# Patient Record
Sex: MALE | Race: WHITE | Employment: FULL TIME | URBAN - METROPOLITAN AREA
[De-identification: names, ages, dates, MRNs, and addresses within clinical notes are randomized per-mention and may not be internally consistent; named-entity substitution may affect disease eponyms.]

---

## 2019-05-25 ENCOUNTER — HOSPITAL ENCOUNTER (EMERGENCY)
Age: 74
Discharge: HOME | End: 2019-05-25
Payer: MEDICARE

## 2019-05-25 VITALS
TEMPERATURE: 98.4 F | OXYGEN SATURATION: 96 % | DIASTOLIC BLOOD PRESSURE: 70 MMHG | BODY MASS INDEX: 33 KG/M2 | WEIGHT: 230 LBS | RESPIRATION RATE: 18 BRPM | SYSTOLIC BLOOD PRESSURE: 124 MMHG | HEART RATE: 90 BPM

## 2019-05-25 DIAGNOSIS — L50.9 URTICARIA: Primary | ICD-10-CM

## 2019-05-25 PROCEDURE — 6370000000 HC RX 637 (ALT 250 FOR IP)

## 2019-05-25 PROCEDURE — 99213 OFFICE O/P EST LOW 20 MIN: CPT

## 2019-05-25 PROCEDURE — 96372 THER/PROPH/DIAG INJ SC/IM: CPT

## 2019-05-25 PROCEDURE — 6360000002 HC RX W HCPCS

## 2019-05-25 RX ORDER — METHYLPREDNISOLONE SODIUM SUCCINATE 125 MG/2ML
INJECTION, POWDER, LYOPHILIZED, FOR SOLUTION INTRAMUSCULAR; INTRAVENOUS
Status: COMPLETED
Start: 2019-05-25 | End: 2019-05-25

## 2019-05-25 RX ORDER — METHYLPREDNISOLONE SODIUM SUCCINATE 125 MG/2ML
125 INJECTION, POWDER, LYOPHILIZED, FOR SOLUTION INTRAMUSCULAR; INTRAVENOUS ONCE
Status: COMPLETED | OUTPATIENT
Start: 2019-05-25 | End: 2019-05-25

## 2019-05-25 RX ORDER — DIPHENHYDRAMINE HCL 25 MG
50 TABLET ORAL ONCE
Status: COMPLETED | OUTPATIENT
Start: 2019-05-25 | End: 2019-05-25

## 2019-05-25 RX ORDER — DIPHENHYDRAMINE HCL 25 MG
TABLET ORAL
Status: COMPLETED
Start: 2019-05-25 | End: 2019-05-25

## 2019-05-25 RX ORDER — METHYLPREDNISOLONE 4 MG/1
TABLET ORAL
Qty: 21 TABLET | Status: SHIPPED | OUTPATIENT
Start: 2019-05-25 | End: 2019-05-31

## 2019-05-25 RX ADMIN — Medication 50 MG: at 10:06

## 2019-05-25 RX ADMIN — METHYLPREDNISOLONE SODIUM SUCCINATE 125 MG: 125 INJECTION, POWDER, FOR SOLUTION INTRAMUSCULAR; INTRAVENOUS at 10:06

## 2019-05-25 RX ADMIN — DIPHENHYDRAMINE HCL 50 MG: 25 TABLET, COATED ORAL at 10:06

## 2019-05-25 RX ADMIN — METHYLPREDNISOLONE SODIUM SUCCINATE 125 MG: 125 INJECTION, POWDER, LYOPHILIZED, FOR SOLUTION INTRAMUSCULAR; INTRAVENOUS at 10:06

## 2019-05-25 NOTE — ED PROVIDER NOTES
Department of Emergency Medicine  [address] [place] [address]  Provider Note  Admit Date/Time: 5/25/2019  9:58 AM  Room: 03/03  MRN: [id number]  Chief Complaint: Rash (Pt c/o raised red rash all over his body since this AM states he has been on Bactrim for 7 days, took his last dose yesterday)       History of Present Illness   Source of history provided by:  patient. History/Exam Limitations: none. Teresa Byers is a 72 y.o. male who has a past medical history of:   Past Medical History:   Diagnosis Date    Hypothyroid     presents to the urgent care center by private car for evaluation of hives. He said he had been on Bactrim  And took his last dose yesterday . Around 1:00 this morning developed hives all over his body. HE  is not having any trouble breathing or swallowing he is not short of breath his throat does not feel tight. ROS    Pertinent positives and negatives are stated within HPI, all other systems reviewed and are negative. No past surgical history on file. Social History:  reports that he has never smoked. He does not have any smokeless tobacco history on file. He reports that he does not drink alcohol or use drugs. Family History: family history is not on file. Allergies: Bactrim [sulfamethoxazole-trimethoprim] and Pcn [penicillins]    Physical Exam   Oxygen Saturation Interpretation: Normal.   ED Triage Vitals [05/25/19 1001]   BP Temp Temp Source Pulse Resp SpO2 Height Weight   136/77 98.4 °F (36.9 °C) Oral 98 18 96 % -- 230 lb (104.3 kg)       Physical Exam  · Constitutional/General: Alert and oriented x3, well appearing, non toxic in NAD  · HEENT:  NC/NT. PERRLA,  Airway patent. No facial,  Lip,  Tongue,  or uvular swelling  · Neck: Supple, full ROM, non tender to palpation in the midline, no stridor, no crepitus, no meningeal signs  · Respiratory: Lungs clear to auscultation bilaterally, no wheezes, rales, or rhonchi.  Not in respiratory distress  · CV:  Regular

## 2022-08-24 ENCOUNTER — HOSPITAL ENCOUNTER (EMERGENCY)
Age: 77
Discharge: HOME | End: 2022-08-24
Attending: EMERGENCY MEDICINE
Payer: MEDICARE

## 2022-08-24 VITALS
HEART RATE: 94 BPM | SYSTOLIC BLOOD PRESSURE: 178 MMHG | OXYGEN SATURATION: 99 % | DIASTOLIC BLOOD PRESSURE: 89 MMHG | RESPIRATION RATE: 20 BRPM | TEMPERATURE: 98.6 F

## 2022-08-24 DIAGNOSIS — L02.91 ABSCESS: Primary | ICD-10-CM

## 2022-08-24 PROCEDURE — 90471 IMMUNIZATION ADMIN: CPT | Performed by: PHYSICIAN ASSISTANT

## 2022-08-24 PROCEDURE — 90714 TD VACC NO PRESV 7 YRS+ IM: CPT | Performed by: PHYSICIAN ASSISTANT

## 2022-08-24 PROCEDURE — 87070 CULTURE OTHR SPECIMN AEROBIC: CPT

## 2022-08-24 PROCEDURE — 2500000003 HC RX 250 WO HCPCS: Performed by: PHYSICIAN ASSISTANT

## 2022-08-24 PROCEDURE — 6360000002 HC RX W HCPCS: Performed by: PHYSICIAN ASSISTANT

## 2022-08-24 PROCEDURE — 99284 EMERGENCY DEPT VISIT MOD MDM: CPT

## 2022-08-24 PROCEDURE — 87075 CULTR BACTERIA EXCEPT BLOOD: CPT

## 2022-08-24 RX ORDER — TETANUS AND DIPHTHERIA TOXOIDS ADSORBED 2; 2 [LF]/.5ML; [LF]/.5ML
0.5 INJECTION INTRAMUSCULAR ONCE
Status: COMPLETED | OUTPATIENT
Start: 2022-08-24 | End: 2022-08-24

## 2022-08-24 RX ORDER — DOXYCYCLINE HYCLATE 100 MG/1
100 CAPSULE ORAL ONCE
Status: DISCONTINUED | OUTPATIENT
Start: 2022-08-24 | End: 2022-08-24

## 2022-08-24 RX ORDER — LIDOCAINE HYDROCHLORIDE AND EPINEPHRINE 10; 10 MG/ML; UG/ML
20 INJECTION, SOLUTION INFILTRATION; PERINEURAL ONCE
Status: COMPLETED | OUTPATIENT
Start: 2022-08-24 | End: 2022-08-24

## 2022-08-24 RX ADMIN — LIDOCAINE HYDROCHLORIDE AND EPINEPHRINE 20 ML: 10; 10 INJECTION, SOLUTION INFILTRATION; PERINEURAL at 11:45

## 2022-08-24 RX ADMIN — TETANUS AND DIPHTHERIA TOXOIDS ADSORBED 0.5 ML: 2; 2 INJECTION INTRAMUSCULAR at 11:19

## 2022-08-24 NOTE — ED PROVIDER NOTES
ED Attending shared visit  CC: Lucrecia Whitman [id number]  Department of Emergency Medicine   ED  Encounter Note  Admit Date/RoomTime: 2022  8:53 AM  ED Room:     NAME: Cris Franks  : 1945  MRN: [id number]     Chief Complaint:  Abscess (Sent in by dermatology )    History of Present Illness        Cris Franks is a 78 y.o. old male who presents to the emergency department by private vehicle, for gradual onset tender area on left sided mid back, which occured 1 week(s) prior to arrival.  Since onset the symptoms have been persistent and worsening and moderate in severity. Symptoms are associated with increasing pain and swelling. He has a history of \"cyst\" that has been present for years and he believes that it got infected. He denies any fever, chills, night sweats, headache, chest pain, shortness of breath, nausea, vomiting, diarrhea, abdominal pain, or any midline back pain. ROS   Pertinent positives and negatives are stated within HPI, all other systems reviewed and are negative. Past Medical History:  has a past medical history of Hypothyroid. Surgical History:  has no past surgical history on file. Social History:  reports that he does not drink alcohol and does not use drugs. Family History: family history is not on file. Allergies: Bactrim [sulfamethoxazole-trimethoprim], Lisinopril, and Pcn [penicillins]    Physical Exam   Oxygen Saturation Interpretation: Normal.        ED Triage Vitals   BP Temp Temp src Pulse Resp SpO2 Height Weight   -- -- -- -- -- -- -- --       Physical Exam  Constitutional:  Alert, development consistent with age. HEENT:  NC/NT. Airway patent  Neck:  Normal ROM. Supple. Respiratory:  Clear to auscultation and breath sounds equal.  CV:  Regular rate and rhythm, normal heart sounds, without pathological murmurs, ectopy, gallops, or rubs. Back: No midline tenderness,. No crepitus or step-off. .  Extremities: No tenderness or edema noted.  Integument:  Normal turgor. Warm, dry. Tennis ball sized abscess to the patient's left lower thoracic back with surrounding erythema  Lymphatics: No lymphangitis or adenopathy noted. Neurological:  Oriented. Motor functions intact. Lab / Imaging Results   (All laboratory and radiology results have been personally reviewed by myself)  Labs:  No results found for this visit on 08/24/22. Imaging: All Radiology results interpreted by Radiologist unless otherwise noted. No orders to display     ED Course / Medical Decision Making     Medications   lidocaine-EPINEPHrine 1 %-1:[id number] injection 20 mL (has no administration in time range)   diptheria-tetanus toxoids [facility]) 2-2 LF/0.5ML injection 0.5 mL (has no administration in time range)        Re-examination:  8/24/22       Time: 1045  Patients symptoms have improved after treatment    Consult(s):   None    Procedure(s):  PROCEDURE  8/24/22       Time: 1015    INCISION AND DRAINAGE  Risks, benefits and alternatives (for applicable procedures below) described. Performed By: Linda Graves PA-C. Indication: Abscess located on back  Informed consent obtained: The patient was counseled regarding the procedure, it's indications, risks, potential complications and alternatives and any questions were answered. Consent was obtained. .  Prep: The skin was cleansed with povidone iodine, wiped with isopropyl alcohol and draped in a sterile fashion. Local Anesthesia:  obtained with Lidocaine 1% with epinephrine. Incision: The Abscess located on back was Incised by scalpal and copious, purulent fluid was drained. A wound culture was obtained. The wound  was irrigated and was packed with iodoform gauze. The wound was then covered with a sterile dressing. Patient tolerated the procedure well. MDM:      Patient presents to the emergency department for an abscess. Abscess was incised and drained in the emergency department.   Patient to continue taking doxycycline. Encouraged to call his dermatologist to schedule close follow-up appointment. Wound care discussed. Signs of worsening skin infection explained. Strict return precautions were discussed and he should come back immediately for new or worsening symptoms. .    Plan of Care/Counseling:  Charmaine Mcdaniel PA-C reviewed today's visit with the patient in addition to providing specific details for the plan of care and counseling regarding the diagnosis and prognosis. Questions are answered at this time and are agreeable with the plan. Assessment      1. Abscess      Plan   Discharged home  Patient condition is good    New Medications     New Prescriptions    No medications on file     Electronically signed by Charmaine Mcdaniel PA-C   DD: 8/24/22  **This report was transcribed using voice recognition software. Every effort was made to ensure accuracy; however, inadvertent computerized transcription errors may be present.   END OF ED PROVIDER NOTE        Charmaine Mcdaniel PA-C  08/24/22 1048

## 2022-08-26 ENCOUNTER — HOSPITAL ENCOUNTER (EMERGENCY)
Age: 77
Discharge: HOME | End: 2022-08-26
Payer: MEDICARE

## 2022-08-26 VITALS
TEMPERATURE: 98.7 F | DIASTOLIC BLOOD PRESSURE: 90 MMHG | BODY MASS INDEX: 31.5 KG/M2 | RESPIRATION RATE: 20 BRPM | OXYGEN SATURATION: 99 % | HEART RATE: 94 BPM | SYSTOLIC BLOOD PRESSURE: 160 MMHG | WEIGHT: 220 LBS | HEIGHT: 70 IN

## 2022-08-26 DIAGNOSIS — Z51.89 VISIT FOR WOUND CHECK: Primary | ICD-10-CM

## 2022-08-26 LAB — WOUND/ABSCESS: NORMAL

## 2022-08-26 PROCEDURE — 99211 OFF/OP EST MAY X REQ PHY/QHP: CPT

## 2022-08-26 RX ORDER — DOXYCYCLINE HYCLATE 100 MG
100 TABLET ORAL 2 TIMES DAILY
Qty: 8 TABLET | Refills: 0 | Status: SHIPPED | OUTPATIENT
Start: 2022-08-30 | End: 2022-09-03

## 2022-08-26 RX ORDER — DOXYCYCLINE HYCLATE 100 MG/1
100 CAPSULE ORAL 2 TIMES DAILY
COMMUNITY
End: 2022-10-30

## 2022-08-26 ASSESSMENT — PAIN - FUNCTIONAL ASSESSMENT: PAIN_FUNCTIONAL_ASSESSMENT: 0-10

## 2022-08-26 ASSESSMENT — PAIN SCALES - GENERAL: PAINLEVEL_OUTOF10: 0

## 2022-08-26 NOTE — DISCHARGE INSTRUCTIONS
Keep area clean  Change dressing daily and as needed  Continue doxycycline  Start taking extending doxy  If symptoms worsen go to the emergency department.

## 2022-08-26 NOTE — ED PROVIDER NOTES
[address] [facility]  Department of Emergency Medicine  UC Encounter Note  22   1:12 PM EDT      NAME: Raymond Mendoza  :  1945  MRN:  [id number]    Chief Complaint: Wound Check (Had I+D to cyst on back in ER here to have packing removed and someone to look at wound)      This is a 79-year-old male the presents to urgent care for wound check. He was seen in the emergency department 2 days ago he states and had a large cyst abscessed area to his back removed. There was packing placed he is here to have the packing removed. He denies any fevers or chills. He states that his wife says the area looks better. On first contact patient he appears to be in no acute distress. He is currently on doxycycline. Review of Systems  Pertinent positives and negatives are stated within HPI, all other systems reviewed and are negative. Physical Exam  Vitals and nursing note reviewed. Constitutional:       Appearance: He is well-developed. HENT:      Head: Normocephalic and atraumatic. Jaw: No trismus. Right Ear: Hearing, tympanic membrane, ear canal and external ear normal.      Left Ear: Hearing, tympanic membrane, ear canal and external ear normal.      Nose: Nose normal.      Right Sinus: No maxillary sinus tenderness or frontal sinus tenderness. Left Sinus: No maxillary sinus tenderness or frontal sinus tenderness. Mouth/Throat:      Pharynx: Uvula midline. No uvula swelling. Eyes:      General: Lids are normal.      Conjunctiva/sclera: Conjunctivae normal.      Pupils: Pupils are equal, round, and reactive to light. Cardiovascular:      Rate and Rhythm: Normal rate and regular rhythm. Heart sounds: Normal heart sounds. No murmur heard. Pulmonary:      Effort: Pulmonary effort is normal.      Breath sounds: Normal breath sounds. Abdominal:      General: Bowel sounds are normal.      Palpations: Abdomen is soft. Abdomen is not rigid.       Tenderness: and does not use drugs. Family History: family history is not on file. The patients home medications have been reviewed. Allergies: Bactrim [sulfamethoxazole-trimethoprim], Lisinopril, and Pcn [penicillins]    -------------------------------------------------- RESULTS -------------------------------------------------  No results found for this visit on 08/26/22. No orders to display       ------------------------- NURSING NOTES AND VITALS REVIEWED ---------------------------   The nursing notes within the ED encounter and vital signs as below have been reviewed. BP (!) 160/90   Pulse 94   Temp 98.7 °F (37.1 °C) (Infrared)   Resp 20   Ht 5' 10\" (1.778 m)   Wt 220 lb (99.8 kg)   SpO2 99%   BMI 31.57 kg/m²   Oxygen Saturation Interpretation: Normal      ------------------------------------------ PROGRESS NOTES ------------------------------------------   I have spoken with the patient and discussed todays results, in addition to providing specific details for the plan of care and counseling regarding the diagnosis and prognosis. Their questions are answered at this time and they are agreeable with the plan.      --------------------------------- ADDITIONAL PROVIDER NOTES ---------------------------------     This patient is stable for discharge. I have shared the specific conditions for return, as well as the importance of follow-up. * NOTE: This report was transcribed using voice recognition software. Every effort was made to ensure accuracy; however, inadvertent computerized transcription errors may be present.    --------------------------------- IMPRESSION AND DISPOSITION ---------------------------------    IMPRESSION  1.  Visit for wound check        DISPOSITION  Disposition: Discharge to home  Patient condition is good       Nicolette Grady PA-C  08/26/22 0638

## 2022-08-27 LAB
ANAEROBIC CULTURE: ABNORMAL
ANAEROBIC CULTURE: ABNORMAL
ORGANISM: ABNORMAL

## 2022-10-30 ENCOUNTER — HOSPITAL ENCOUNTER (EMERGENCY)
Age: 77
Discharge: ANOTHER HOSPITAL | DRG: 698 | End: 2022-10-30
Payer: MEDICARE

## 2022-10-30 ENCOUNTER — HOSPITAL ENCOUNTER (INPATIENT)
Age: 77
LOS: 1 days | Discharge: HOME | DRG: 698 | End: 2022-10-31
Attending: EMERGENCY MEDICINE | Admitting: INTERNAL MEDICINE
Payer: MEDICARE

## 2022-10-30 VITALS
RESPIRATION RATE: 20 BRPM | DIASTOLIC BLOOD PRESSURE: 106 MMHG | HEIGHT: 70 IN | BODY MASS INDEX: 33.64 KG/M2 | SYSTOLIC BLOOD PRESSURE: 143 MMHG | HEART RATE: 109 BPM | OXYGEN SATURATION: 96 % | TEMPERATURE: 100.2 F | WEIGHT: 235 LBS

## 2022-10-30 DIAGNOSIS — N39.0 SEPSIS DUE TO URINARY TRACT INFECTION (HCC): Primary | ICD-10-CM

## 2022-10-30 DIAGNOSIS — R50.9 FEVER, UNSPECIFIED FEVER CAUSE: ICD-10-CM

## 2022-10-30 DIAGNOSIS — A41.9 SEPSIS DUE TO URINARY TRACT INFECTION (HCC): Primary | ICD-10-CM

## 2022-10-30 DIAGNOSIS — N39.0 URINARY TRACT INFECTION WITHOUT HEMATURIA, SITE UNSPECIFIED: Primary | ICD-10-CM

## 2022-10-30 DIAGNOSIS — D72.829 LEUKOCYTOSIS, UNSPECIFIED TYPE: ICD-10-CM

## 2022-10-30 DIAGNOSIS — T83.511A URINARY TRACT INFECTION ASSOCIATED WITH INDWELLING URETHRAL CATHETER, INITIAL ENCOUNTER (HCC): ICD-10-CM

## 2022-10-30 DIAGNOSIS — N39.0 URINARY TRACT INFECTION ASSOCIATED WITH INDWELLING URETHRAL CATHETER, INITIAL ENCOUNTER (HCC): ICD-10-CM

## 2022-10-30 DIAGNOSIS — R00.0 TACHYCARDIA: ICD-10-CM

## 2022-10-30 LAB
ALBUMIN SERPL-MCNC: 4.3 G/DL (ref 3.5–5.2)
ALP BLD-CCNC: 112 U/L (ref 40–129)
ALT SERPL-CCNC: 29 U/L (ref 0–40)
ANION GAP SERPL CALCULATED.3IONS-SCNC: 9 MMOL/L (ref 7–16)
AST SERPL-CCNC: 25 U/L (ref 0–39)
BACTERIA: ABNORMAL /HPF
BACTERIA: ABNORMAL /HPF
BASOPHILS ABSOLUTE: 0.01 E9/L (ref 0–0.2)
BASOPHILS ABSOLUTE: 0.03 E9/L (ref 0–0.2)
BASOPHILS RELATIVE PERCENT: 0.1 % (ref 0–2)
BASOPHILS RELATIVE PERCENT: 0.2 % (ref 0–2)
BILIRUB SERPL-MCNC: 1.7 MG/DL (ref 0–1.2)
BILIRUBIN URINE: NEGATIVE
BILIRUBIN URINE: NEGATIVE
BLOOD, URINE: ABNORMAL
BLOOD, URINE: ABNORMAL
BUN BLDV-MCNC: 17 MG/DL (ref 6–23)
CALCIUM SERPL-MCNC: 9.3 MG/DL (ref 8.6–10.2)
CHLORIDE BLD-SCNC: 101 MMOL/L (ref 98–107)
CLARITY: ABNORMAL
CLARITY: ABNORMAL
CO2: 24 MMOL/L (ref 22–29)
CO2: 26 MMOL/L (ref 22–29)
COLOR: ABNORMAL
COLOR: ABNORMAL
CREAT SERPL-MCNC: 1.1 MG/DL (ref 0.7–1.2)
EOSINOPHILS ABSOLUTE: 0.01 E9/L (ref 0.05–0.5)
EOSINOPHILS ABSOLUTE: 0.02 E9/L (ref 0.05–0.5)
EOSINOPHILS RELATIVE PERCENT: 0.1 % (ref 0–6)
EOSINOPHILS RELATIVE PERCENT: 0.1 % (ref 0–6)
EPITHELIAL CELLS, UA: ABNORMAL /HPF
EPITHELIAL CELLS, UA: ABNORMAL /HPF
GFR SERPL CREATININE-BSD FRML MDRD: >60 ML/MIN/1.73
GFR SERPL CREATININE-BSD FRML MDRD: >60 ML/MIN/1.73
GLUCOSE BLD-MCNC: 115 MG/DL (ref 74–99)
GLUCOSE BLD-MCNC: 145 MG/DL (ref 74–99)
GLUCOSE URINE: NEGATIVE MG/DL
GLUCOSE URINE: NEGATIVE MG/DL
HCT VFR BLD CALC: 47 % (ref 37–54)
HCT VFR BLD CALC: 47.5 % (ref 37–54)
HEMOGLOBIN: 16.4 G/DL (ref 12.5–16.5)
HEMOGLOBIN: 16.5 G/DL (ref 12.5–16.5)
IMMATURE GRANULOCYTES #: 0.05 E9/L
IMMATURE GRANULOCYTES #: 0.05 E9/L
IMMATURE GRANULOCYTES %: 0.4 % (ref 0–5)
IMMATURE GRANULOCYTES %: 0.4 % (ref 0–5)
KETONES, URINE: 15 MG/DL
KETONES, URINE: 15 MG/DL
LACTIC ACID, SEPSIS: 1.4 MMOL/L (ref 0.5–1.9)
LACTIC ACID, SEPSIS: 1.4 MMOL/L (ref 0.5–1.9)
LEUKOCYTE ESTERASE, URINE: ABNORMAL
LEUKOCYTE ESTERASE, URINE: ABNORMAL
LYMPHOCYTES ABSOLUTE: 0.89 E9/L (ref 1.5–4)
LYMPHOCYTES ABSOLUTE: 0.93 E9/L (ref 1.5–4)
LYMPHOCYTES RELATIVE PERCENT: 6.7 % (ref 20–42)
LYMPHOCYTES RELATIVE PERCENT: 6.8 % (ref 20–42)
MCH RBC QN AUTO: 30.8 PG (ref 26–35)
MCH RBC QN AUTO: 30.9 PG (ref 26–35)
MCHC RBC AUTO-ENTMCNC: 34.5 % (ref 32–34.5)
MCHC RBC AUTO-ENTMCNC: 35.1 % (ref 32–34.5)
MCV RBC AUTO: 88 FL (ref 80–99.9)
MCV RBC AUTO: 89.3 FL (ref 80–99.9)
MONOCYTES ABSOLUTE: 0.84 E9/L (ref 0.1–0.95)
MONOCYTES ABSOLUTE: 0.85 E9/L (ref 0.1–0.95)
MONOCYTES RELATIVE PERCENT: 6.1 % (ref 2–12)
MONOCYTES RELATIVE PERCENT: 6.4 % (ref 2–12)
NEUTROPHILS ABSOLUTE: 11.4 E9/L (ref 1.8–7.3)
NEUTROPHILS ABSOLUTE: 11.85 E9/L (ref 1.8–7.3)
NEUTROPHILS RELATIVE PERCENT: 86.3 % (ref 43–80)
NEUTROPHILS RELATIVE PERCENT: 86.4 % (ref 43–80)
NITRITE, URINE: POSITIVE
NITRITE, URINE: POSITIVE
PDW BLD-RTO: 13.1 FL (ref 11.5–15)
PDW BLD-RTO: 13.2 FL (ref 11.5–15)
PERFORMED ON: ABNORMAL
PH UA: 5.5 (ref 5–9)
PH UA: 6 (ref 5–9)
PLATELET # BLD: 201 E9/L (ref 130–450)
PLATELET # BLD: 203 E9/L (ref 130–450)
PMV BLD AUTO: 10.1 FL (ref 7–12)
PMV BLD AUTO: 9.6 FL (ref 7–12)
POC ANION GAP: 11 MMOL/L (ref 7–16)
POC BUN: 14 MG/DL (ref 8–23)
POC CHLORIDE: 107 MMOL/L (ref 100–108)
POC CREATININE: 1.1 MG/DL (ref 0.7–1.2)
POC POTASSIUM: 4.1 MMOL/L (ref 3.5–5)
POC SODIUM: 142 MMOL/L (ref 132–146)
POTASSIUM REFLEX MAGNESIUM: 3.8 MMOL/L (ref 3.5–5)
PROTEIN UA: 100 MG/DL
PROTEIN UA: 100 MG/DL
RBC # BLD: 5.32 E12/L (ref 3.8–5.8)
RBC # BLD: 5.34 E12/L (ref 3.8–5.8)
RBC UA: >20 /HPF (ref 0–2)
RBC UA: ABNORMAL /HPF (ref 0–2)
SODIUM BLD-SCNC: 136 MMOL/L (ref 132–146)
SPECIFIC GRAVITY UA: >=1.03 (ref 1–1.03)
SPECIFIC GRAVITY UA: >=1.03 (ref 1–1.03)
TOTAL PROTEIN: 7.4 G/DL (ref 6.4–8.3)
UROBILINOGEN, URINE: 0.2 E.U./DL
UROBILINOGEN, URINE: 0.2 E.U./DL
WBC # BLD: 13.2 E9/L (ref 4.5–11.5)
WBC # BLD: 13.7 E9/L (ref 4.5–11.5)
WBC UA: >20 /HPF (ref 0–5)
WBC UA: ABNORMAL /HPF (ref 0–5)

## 2022-10-30 PROCEDURE — 99285 EMERGENCY DEPT VISIT HI MDM: CPT

## 2022-10-30 PROCEDURE — 87040 BLOOD CULTURE FOR BACTERIA: CPT

## 2022-10-30 PROCEDURE — 81001 URINALYSIS AUTO W/SCOPE: CPT

## 2022-10-30 PROCEDURE — 6370000000 HC RX 637 (ALT 250 FOR IP): Performed by: NURSE PRACTITIONER

## 2022-10-30 PROCEDURE — 1200000000 HC SEMI PRIVATE

## 2022-10-30 PROCEDURE — 36415 COLL VENOUS BLD VENIPUNCTURE: CPT

## 2022-10-30 PROCEDURE — 85025 COMPLETE CBC W/AUTO DIFF WBC: CPT

## 2022-10-30 PROCEDURE — 99211 OFF/OP EST MAY X REQ PHY/QHP: CPT

## 2022-10-30 PROCEDURE — 80053 COMPREHEN METABOLIC PANEL: CPT

## 2022-10-30 PROCEDURE — 2580000003 HC RX 258: Performed by: STUDENT IN AN ORGANIZED HEALTH CARE EDUCATION/TRAINING PROGRAM

## 2022-10-30 PROCEDURE — 82565 ASSAY OF CREATININE: CPT

## 2022-10-30 PROCEDURE — 82947 ASSAY GLUCOSE BLOOD QUANT: CPT

## 2022-10-30 PROCEDURE — 80051 ELECTROLYTE PANEL: CPT

## 2022-10-30 PROCEDURE — 84520 ASSAY OF UREA NITROGEN: CPT

## 2022-10-30 PROCEDURE — 83605 ASSAY OF LACTIC ACID: CPT

## 2022-10-30 PROCEDURE — 96361 HYDRATE IV INFUSION ADD-ON: CPT

## 2022-10-30 PROCEDURE — 6360000002 HC RX W HCPCS: Performed by: STUDENT IN AN ORGANIZED HEALTH CARE EDUCATION/TRAINING PROGRAM

## 2022-10-30 PROCEDURE — 87088 URINE BACTERIA CULTURE: CPT

## 2022-10-30 PROCEDURE — 96374 THER/PROPH/DIAG INJ IV PUSH: CPT

## 2022-10-30 RX ORDER — 0.9 % SODIUM CHLORIDE 0.9 %
1000 INTRAVENOUS SOLUTION INTRAVENOUS ONCE
Status: COMPLETED | OUTPATIENT
Start: 2022-10-30 | End: 2022-10-30

## 2022-10-30 RX ORDER — SODIUM CHLORIDE 0.9 % (FLUSH) 0.9 %
5-40 SYRINGE (ML) INJECTION EVERY 12 HOURS SCHEDULED
Status: DISCONTINUED | OUTPATIENT
Start: 2022-10-31 | End: 2022-10-31 | Stop reason: HOSPADM

## 2022-10-30 RX ORDER — ACETAMINOPHEN 650 MG/1
650 SUPPOSITORY RECTAL EVERY 6 HOURS PRN
Status: DISCONTINUED | OUTPATIENT
Start: 2022-10-30 | End: 2022-10-31 | Stop reason: HOSPADM

## 2022-10-30 RX ORDER — DEXTROSE MONOHYDRATE 100 MG/ML
INJECTION, SOLUTION INTRAVENOUS CONTINUOUS PRN
Status: DISCONTINUED | OUTPATIENT
Start: 2022-10-30 | End: 2022-10-31 | Stop reason: HOSPADM

## 2022-10-30 RX ORDER — SODIUM CHLORIDE 0.9 % (FLUSH) 0.9 %
5-40 SYRINGE (ML) INJECTION PRN
Status: DISCONTINUED | OUTPATIENT
Start: 2022-10-30 | End: 2022-10-31 | Stop reason: HOSPADM

## 2022-10-30 RX ORDER — SODIUM CHLORIDE 9 MG/ML
INJECTION, SOLUTION INTRAVENOUS PRN
Status: DISCONTINUED | OUTPATIENT
Start: 2022-10-30 | End: 2022-10-31 | Stop reason: HOSPADM

## 2022-10-30 RX ORDER — ACETAMINOPHEN 325 MG/1
650 TABLET ORAL EVERY 6 HOURS PRN
Status: DISCONTINUED | OUTPATIENT
Start: 2022-10-30 | End: 2022-10-31 | Stop reason: HOSPADM

## 2022-10-30 RX ORDER — POLYETHYLENE GLYCOL 3350 17 G/17G
17 POWDER, FOR SOLUTION ORAL DAILY PRN
Status: DISCONTINUED | OUTPATIENT
Start: 2022-10-30 | End: 2022-10-31 | Stop reason: HOSPADM

## 2022-10-30 RX ORDER — ACETAMINOPHEN 325 MG/1
650 TABLET ORAL ONCE
Status: COMPLETED | OUTPATIENT
Start: 2022-10-30 | End: 2022-10-30

## 2022-10-30 RX ORDER — ENOXAPARIN SODIUM 100 MG/ML
30 INJECTION SUBCUTANEOUS 2 TIMES DAILY
Status: DISCONTINUED | OUTPATIENT
Start: 2022-10-31 | End: 2022-10-31 | Stop reason: HOSPADM

## 2022-10-30 RX ADMIN — SODIUM CHLORIDE 1000 ML: 9 INJECTION, SOLUTION INTRAVENOUS at 17:30

## 2022-10-30 RX ADMIN — ACETAMINOPHEN 650 MG: 325 TABLET ORAL at 13:25

## 2022-10-30 RX ADMIN — WATER 2000 MG: 1 INJECTION INTRAMUSCULAR; INTRAVENOUS; SUBCUTANEOUS at 16:46

## 2022-10-30 ASSESSMENT — PAIN DESCRIPTION - LOCATION
LOCATION: PENIS

## 2022-10-30 ASSESSMENT — PAIN SCALES - GENERAL
PAINLEVEL_OUTOF10: 5
PAINLEVEL_OUTOF10: 4
PAINLEVEL_OUTOF10: 4
PAINLEVEL_OUTOF10: 5
PAINLEVEL_OUTOF10: 4

## 2022-10-30 ASSESSMENT — LIFESTYLE VARIABLES: HOW OFTEN DO YOU HAVE A DRINK CONTAINING ALCOHOL: MONTHLY OR LESS

## 2022-10-30 ASSESSMENT — PAIN DESCRIPTION - ONSET
ONSET: GRADUAL
ONSET: GRADUAL

## 2022-10-30 ASSESSMENT — PAIN DESCRIPTION - FREQUENCY
FREQUENCY: INTERMITTENT
FREQUENCY: INTERMITTENT

## 2022-10-30 ASSESSMENT — PAIN - FUNCTIONAL ASSESSMENT
PAIN_FUNCTIONAL_ASSESSMENT: 0-10
PAIN_FUNCTIONAL_ASSESSMENT: NONE - DENIES PAIN
PAIN_FUNCTIONAL_ASSESSMENT: 0-10

## 2022-10-30 ASSESSMENT — PAIN DESCRIPTION - PAIN TYPE
TYPE: ACUTE PAIN
TYPE: ACUTE PAIN

## 2022-10-30 ASSESSMENT — PAIN DESCRIPTION - DESCRIPTORS
DESCRIPTORS: BURNING

## 2022-10-30 NOTE — ED PROVIDER NOTES
Department of Emergency Medicine   [facility]  Provider Note  Admit Date/RoomTime: 10/30/2022 12:40 PM  Room:       NAME: Radha Peraza  : 1945  MRN: [id number]     Chief Complaint:  Other (States he has had a conrad catheter since . It is changed monthly and was changed 2 days ago. Now having burning when urine flows. Thinks he has a \"UTI\".) and Fever (Low grade fever this morning.)    History of Present Illness       Radha Peraza is a 78 y.o. old male who has a past medical history of:   Past Medical History:   Diagnosis Date    COVID     Enlarged prostate     Hypothyroid     presents to the urgent care center by private vehicle, for evaluation. He has a fever, he said the fever just started this morning fHe says that he also has  some burning \"when the urine flows into the tube \". He thinks he could have a UTI his Conrad catheter was just changed 2 days ago. He has had that in since  and he said he is going to have a surgery to alleviate his problem in future. He has had a catheter in since  of this year. Denies any respiratory symptoms he denies any abdominal pain or back pain does not have any cough or shortness of breath. ROS    Pertinent positives and negatives are stated within HPI, all other systems reviewed and are negative. Past Surgical History:   Procedure Laterality Date    SKIN BIOPSY     Social History:  reports that he has never smoked. He has never used smokeless tobacco. He reports that he does not drink alcohol and does not use drugs. Family History: family history is not on file.    Allergies: Bactrim [sulfamethoxazole-trimethoprim], Lisinopril, and Pcn [penicillins]    Physical Exam      Oxygen Saturation Interpretation: Normal.        ED Triage Vitals [10/30/22 1245]   BP Temp Temp Source Heart Rate Resp SpO2 Height Weight   (!) 161/96 100.1 °F (37.8 °C) Infrared (!) 111 20 97 % 5' 10\" (1.778 m) 235 lb (106.6 kg) Constitutional:  Alert, no distress  HEENT:  NC/NT. Neck:  Normal ROM. Supple. Respiratory:  Lungs Clear to auscultation and breath sounds equal.  CV:  tachycardia  GI: soft not tender. : conrad catheter in place. Back: CVA Tenderness: No CVA tenderness. Integument:    Warm, dry, without visible rash,   Lymphatics: No lymphangitis or adenopathy noted. Neurological:  Oriented. Motor functions intact.     Lab / Imaging Results   (All laboratory and radiology results have been personally reviewed by myself)  Labs:  Results for orders placed or performed during the hospital encounter of 10/30/22   Urinalysis   Result Value Ref Range    Color, UA DARK YELLOW (A) Straw/Yellow    Clarity, UA CLOUDY (A) Clear    Glucose, Ur Negative Negative mg/dL    Bilirubin Urine Negative Negative    Ketones, Urine 15 (A) Negative mg/dL    Specific Gravity, UA >=1.030 1.005 - 1.030    Blood, Urine MODERATE (A) Negative    pH, UA 6.0 5.0 - 9.0    Protein,  (A) Negative mg/dL    Urobilinogen, Urine 0.2 <2.0 E.U./dL    Nitrite, Urine POSITIVE (A) Negative    Leukocyte Esterase, Urine SMALL (A) Negative   CBC with Auto Differential   Result Value Ref Range    WBC 13.7 (H) 4.5 - 11.5 E9/L    RBC 5.34 3.80 - 5.80 E12/L    Hemoglobin 16.5 12.5 - 16.5 g/dL    Hematocrit 47.0 37.0 - 54.0 %    MCV 88.0 80.0 - 99.9 fL    MCH 30.9 26.0 - 35.0 pg    MCHC 35.1 (H) 32.0 - 34.5 %    RDW 13.2 11.5 - 15.0 fL    Platelets 009 734 - 569 E9/L    MPV 9.6 7.0 - 12.0 fL    Neutrophils % 86.4 (H) 43.0 - 80.0 %    Immature Granulocytes % 0.4 0.0 - 5.0 %    Lymphocytes % 6.8 (L) 20.0 - 42.0 %    Monocytes % 6.1 2.0 - 12.0 %    Eosinophils % 0.1 0.0 - 6.0 %    Basophils % 0.2 0.0 - 2.0 %    Neutrophils Absolute 11.85 (H) 1.80 - 7.30 E9/L    Immature Granulocytes # 0.05 E9/L    Lymphocytes Absolute 0.93 (L) 1.50 - 4.00 E9/L    Monocytes Absolute 0.84 0.10 - 0.95 E9/L    Eosinophils Absolute 0.02 (L) 0.05 - 0.50 E9/L    Basophils Absolute 0.03 0.00 - 0.20 E9/L   Microscopic Urinalysis   Result Value Ref Range    WBC, UA >20 (A) 0 - 5 /HPF    RBC, UA >20 0 - 2 /HPF    Epithelial Cells, UA FEW /HPF    Bacteria, UA MANY (A) None Seen /HPF   POCT Venous   Result Value Ref Range    POC Sodium 142 132 - 146 mmol/L    POC Potassium 4.1 3.5 - 5.0 mmol/L    POC Chloride 107 100 - 108 mmol/L    CO2 24 22 - 29 mmol/L    POC Anion Gap 11 7 - 16 mmol/L    POC Glucose 115 (H) 74 - 99 mg/dl    POC BUN 14 8 - 23 mg/dL    POC Creatinine 1.1 0.7 - 1.2 mg/dL    Est, Glom Filt Rate >60 >=60 mL/min/1.73    Performed on SEE BELOW        Imaging: All Radiology results interpreted by Radiologist unless otherwise noted. No orders to display     ED Course / Medical Decision Making     Medications   acetaminophen (TYLENOL) tablet 650 mg (650 mg Oral Given 10/30/22 1325)          Consults:   None    Medical Decision Making:    Patient's temperature when he came in was 100.1 his heart rate was 111 he was given Tylenol for the fever. Did check a urine on him he is concerned that he has a UTI, since he is febrile I checked a CBC and a chemistry also. Following the Tylenol his heart rate and temperature were rechecked it is now 100.2 and his heart rate is still up at 109. He does have a UTI showing up on his urinalysis and also his white count is elevated at 13.9. There is concern for sepsis since he is tachycardic he is febrile and his white count is elevated. I did speak with the ED attending Dr. Fan Camarena and he does feel that the patient should come over for further evaluation. Discussed this with the patient I did tell him the concern was that he could be getting septic and then he needs to go to the ED for further evaluation. He does agree to  go to the ED        Assessment      1. Urinary tract infection without hematuria, site unspecified    2. Fever, unspecified fever cause    3. Tachycardia    4.  Leukocytosis, unspecified type      Plan   Advised to go to the Emergency Department  New Medications     New Prescriptions    No medications on file     Electronically signed by VINCE Curtis CNP   DD: 10/30/22  **This report was transcribed using voice recognition software. Every effort was made to ensure accuracy; however, inadvertent computerized transcription errors may be present.   END OF ED PROVIDER NOTE            VINCE Curtis CNP  10/30/22 [id number]

## 2022-10-30 NOTE — ED PROVIDER NOTES
Department of Emergency Medicine   ED Provider Note  Admit Date/RoomTime: 10/30/2022  3:41 PM  ED Room: 22/22          History of Present Illness:  10/30/22, Time: 3:48 PM EDT         Anjana Monroy is a 78 y.o. male with history of BPH and indwelling Oconnor catheter presenting to the ED for dysuria, fevers, beginning this morning. The complaint has been persistent, moderate in severity, and worsened by urination, fever improved by Tylenol. Patient previously followed with Dr. Mari Baird for urology here, follows with Dr. Rebbeca Claude for urology at [facility] in [facility] [employer]. Patient states that he had issues with urinary retention secondary to BPH, since July has had indwelling Oconnor catheter. He has his catheter changed every month, just had the catheter changed around 3 days ago at local urology office. Since this morning he started having burning/pain with urine flow in his catheter, had subjective fevers with sweats. He was seen at urgent care and had a temperature of 100.2 Fahrenheit, was given Tylenol there, had lab work that showed a leukocytosis and was tachycardic and therefore was sent to the emergency department for evaluation. Patient currently not taking any antibiotics. No history of previous UTIs. No abdominal pain or flank pain or nausea or vomiting. No chest pain or shortness of breath or cough. No lightheadedness or syncope. No headache or vision changes or numbness weakness or tingling  He does have mild hematuria when he has his Oconnor catheter changed, this is not unusual for him, otherwise denies hematuria or urine discoloration. Review of Systems:     Pertinent positives and negatives are stated within HPI. 10 point ROS otherwise negative.  --------------------------------------------- PAST HISTORY ---------------------------------------------  Past Medical History:  has a past medical history of COVID, Enlarged prostate, and Hypothyroid.     Past Surgical History:  has a past surgical history that includes skin biopsy. Social History:  reports that he has never smoked. He has never used smokeless tobacco. He reports that he does not drink alcohol and does not use drugs. Family History: family history is not on file. The patients home medications have been reviewed. Allergies: Bactrim [sulfamethoxazole-trimethoprim], Lisinopril, and Pcn [penicillins]        ---------------------------------------------------PHYSICAL EXAM--------------------------------------    Constitutional/General: AAO to person/place/time/purpose, NAD, no labored breathing  Head: Normocephalic and atraumatic  Eyes: EOMI, conjunctiva normal, sclera non icteric  Mouth: Moist mucous membranes, uvula midline  Neck: Supple, no stridor, no meningeal signs  Respiratory: Lungs clear to auscultation bilaterally, no wheezes, rales, or rhonchi. Not in respiratory distress  Cardiovascular:  Regular rate. Regular rhythm. No murmurs, no gallops, or rubs. 2+ distal pulses. Equal extremity pulses. Chest: No chest wall tenderness or deformity  GI:  Abdomen Soft, Non tender, Non distended. No rebound, guarding, or rigidity. No pulsatile masses. No CVA tenderness bilaterally. : Indwelling Oconnor catheter with leg bag. Musculoskeletal: Moves all extremities x 4. Warm and well perfused, no clubbing, cyanosis, or edema. Capillary refill <3 seconds  Integument: skin warm and dry. No rashes. Neurologic: GCS 15, no focal deficits, symmetric strength 5/5 in the major muscle groups of upper and lower extremities bilaterally  Psychiatric: Normal Affect    -------------------------------------------------- RESULTS -------------------------------------------------  I have personally reviewed all laboratory and imaging results for this patient. Results are listed below.      LABS:  Results for orders placed or performed during the hospital encounter of 10/30/22   Comprehensive Metabolic Panel w/ Reflex to MG   Result Value Ref Range    Sodium 136 132 - 146 mmol/L    Potassium reflex Magnesium 3.8 3.5 - 5.0 mmol/L    Chloride 101 98 - 107 mmol/L    CO2 26 22 - 29 mmol/L    Anion Gap 9 7 - 16 mmol/L    Glucose 145 (H) 74 - 99 mg/dL    BUN 17 6 - 23 mg/dL    Creatinine 1.1 0.7 - 1.2 mg/dL    Est, Glom Filt Rate >60 >=60 mL/min/1.73    Calcium 9.3 8.6 - 10.2 mg/dL    Total Protein 7.4 6.4 - 8.3 g/dL    Albumin 4.3 3.5 - 5.2 g/dL    Total Bilirubin 1.7 (H) 0.0 - 1.2 mg/dL    Alkaline Phosphatase 112 40 - 129 U/L    ALT 29 0 - 40 U/L    AST 25 0 - 39 U/L   Urinalysis with Microscopic   Result Value Ref Range    Color, UA DKYELLOW Straw/Yellow    Clarity, UA CLOUDY (A) Clear    Glucose, Ur Negative Negative mg/dL    Bilirubin Urine Negative Negative    Ketones, Urine 15 (A) Negative mg/dL    Specific Gravity, UA >=1.030 1.005 - 1.030    Blood, Urine LARGE (A) Negative    pH, UA 5.5 5.0 - 9.0    Protein,  (A) Negative mg/dL    Urobilinogen, Urine 0.2 <2.0 E.U./dL    Nitrite, Urine POSITIVE (A) Negative    Leukocyte Esterase, Urine MODERATE (A) Negative    WBC, UA 10-20 (A) 0 - 5 /HPF    RBC, UA 2-5 0 - 2 /HPF    Epithelial Cells, UA FEW /HPF    Bacteria, UA MANY (A) None Seen /HPF   Lactate, Sepsis   Result Value Ref Range    Lactic Acid, Sepsis 1.4 0.5 - 1.9 mmol/L   Lactate, Sepsis   Result Value Ref Range    Lactic Acid, Sepsis 1.4 0.5 - 1.9 mmol/L   CBC with Auto Differential   Result Value Ref Range    WBC 13.2 (H) 4.5 - 11.5 E9/L    RBC 5.32 3.80 - 5.80 E12/L    Hemoglobin 16.4 12.5 - 16.5 g/dL    Hematocrit 47.5 37.0 - 54.0 %    MCV 89.3 80.0 - 99.9 fL    MCH 30.8 26.0 - 35.0 pg    MCHC 34.5 32.0 - 34.5 %    RDW 13.1 11.5 - 15.0 fL    Platelets 020 237 - 976 E9/L    MPV 10.1 7.0 - 12.0 fL    Neutrophils % 86.3 (H) 43.0 - 80.0 %    Immature Granulocytes % 0.4 0.0 - 5.0 %    Lymphocytes % 6.7 (L) 20.0 - 42.0 %    Monocytes % 6.4 2.0 - 12.0 %    Eosinophils % 0.1 0.0 - 6.0 %    Basophils % 0.1 0.0 - 2.0 %    Neutrophils Absolute 11.40 (H) 1.80 - 7.30 E9/L    Immature Granulocytes # 0.05 E9/L    Lymphocytes Absolute 0.89 (L) 1.50 - 4.00 E9/L    Monocytes Absolute 0.85 0.10 - 0.95 E9/L    Eosinophils Absolute 0.01 (L) 0.05 - 0.50 E9/L    Basophils Absolute 0.01 0.00 - 0.20 E9/L       RADIOLOGY:  Interpreted by Radiologist unless otherwise specified  No orders to display         ------------------------- NURSING NOTES AND VITALS REVIEWED ---------------------------   The nursing notes within the ED encounter and vital signs as below have been reviewed by myself. BP (!) 158/96   Pulse (!) 104   Temp 98.5 °F (36.9 °C)   Resp 22   Wt 235 lb (106.6 kg)   SpO2 98%   BMI 33.72 kg/m²   Oxygen Saturation Interpretation: Normal    The patients available past medical records and past encounters were reviewed. ------------------------------ ED COURSE/MEDICAL DECISION MAKING----------------------  Medications   cefTRIAXone (ROCEPHIN) 2,000 mg in sterile water 20 mL IV syringe (2,000 mg IntraVENous Given 10/30/22 1646)   0.9 % sodium chloride bolus (0 mLs IntraVENous Stopped 10/30/22 1802)            Medical Decision Making: This is a 72-year-old male presenting to the emerged department for fever, dysuria. Patient with indwelling Oconnor catheter, no history of previous UTI, no previous urine cultures. Patient with subjective fevers at home, borderline febrile at 100.2 Fahrenheit at urgent care, given Tylenol there. Patient was leukocytosis of greater than 13,000 here, tachycardic at 104, mildly hypertensive afebrile here. Patient with no abdominal pain or flank pain no concern for ureterolithiasis. Urinalysis with evidence of likely urinary tract infection. No lactic acidosis, lab work otherwise unremarkable and reassuring. Given likely early sepsis with leukocytosis, tachycardia and reported fever as well as patient's age and comorbidities, patient will be admitted for IV antibiotics and further work-up, treatment, monitoring.   Patient given IV Rocephin here, blood cultures obtained prior to antibiotic administration. Patient given IV fluids with improvement in heart rate. Patient comfortable with plan for admission and further treatment. See ED COURSE for additional MDM. Labs & imaging reviewed and interpreted, see RESULTS above. Re-Evaluations:             ED Course as of 10/30/22 2022   Long Mcgregor Oct 30, 2022   2007 Discussed case with Dr. Tamar Lau, agreed to admit patient to tele. [RH]      ED Course User Index  [RH] [address], DO         This patient's ED course included: a personal history and physicial examination, re-evaluation prior to disposition, multiple bedside re-evaluations, IV medications, and cardiac monitoring    This patient has remained hemodynamically stable and improved during their ED course. Consultations:  See ED Course    Counseling: The emergency provider has spoken with the patient and spouse/SO and discussed todays results, in addition to providing specific details for the plan of care and counseling regarding the diagnosis and prognosis. Questions are answered at this time and they are agreeable with the plan.       --------------------------------- IMPRESSION AND DISPOSITION ---------------------------------    IMPRESSION  1. Sepsis due to urinary tract infection ([facility]    2. Urinary tract infection associated with indwelling urethral catheter, [facility]        DISPOSITION  Disposition: Admit to telemetry  Patient condition is stable    NOTE: This report was transcribed using voice recognition software. Every effort was made to ensure accuracy; however, inadvertent computerized transcription errors may be present. Also please note that patient was seen and examined by attending physician. Plan of care and disposition discussed with attending physician and they were immediately available or present for all procedures performed.        -- Darleen Bence, D.O. PGY-3     Resident Physician     Emergency Medicine      10/30/2022 8:22 PM         [address] DO  Resident  10/30/22 2022      ATTENDING PROVIDER ATTESTATION:     I have personally performed and/or participated in the history, exam, medical decision making, and procedures and agree with all pertinent clinical information. I have also reviewed and agree with the past medical, family and social history unless otherwise noted. I have discussed this patient in detail with the resident, and provided the instruction and education regarding fever, dysuria, UTI and sepsis. My findings/Plan: Tachycardic. Lungs CTA bilaterally. Abdomen soft, nontender. Bowel sounds normal. No CVA tenderness. Supportive care. IV antibiotics. Admit for further evaluation and management.        [address] [place] [address],   12/08/22 8671

## 2022-10-30 NOTE — DISCHARGE INSTRUCTIONS
Advised to go to the Emergency Department for further evaluation-- UTI, fever, tachycardia, leukocytosis--

## 2022-10-31 VITALS
SYSTOLIC BLOOD PRESSURE: 171 MMHG | WEIGHT: 235 LBS | OXYGEN SATURATION: 96 % | HEART RATE: 81 BPM | TEMPERATURE: 98 F | DIASTOLIC BLOOD PRESSURE: 77 MMHG | RESPIRATION RATE: 18 BRPM | HEIGHT: 70 IN | BODY MASS INDEX: 33.64 KG/M2

## 2022-10-31 LAB
ALBUMIN SERPL-MCNC: 3.6 G/DL (ref 3.5–5.2)
ALP BLD-CCNC: 122 U/L (ref 40–129)
ALT SERPL-CCNC: 55 U/L (ref 0–40)
ANION GAP SERPL CALCULATED.3IONS-SCNC: 8 MMOL/L (ref 7–16)
AST SERPL-CCNC: 37 U/L (ref 0–39)
BASOPHILS ABSOLUTE: 0.02 E9/L (ref 0–0.2)
BASOPHILS RELATIVE PERCENT: 0.2 % (ref 0–2)
BILIRUB SERPL-MCNC: 1.7 MG/DL (ref 0–1.2)
BUN BLDV-MCNC: 15 MG/DL (ref 6–23)
CALCIUM SERPL-MCNC: 8.9 MG/DL (ref 8.6–10.2)
CHLORIDE BLD-SCNC: 102 MMOL/L (ref 98–107)
CO2: 25 MMOL/L (ref 22–29)
CREAT SERPL-MCNC: 1.1 MG/DL (ref 0.7–1.2)
EOSINOPHILS ABSOLUTE: 0.01 E9/L (ref 0.05–0.5)
EOSINOPHILS RELATIVE PERCENT: 0.1 % (ref 0–6)
GFR SERPL CREATININE-BSD FRML MDRD: >60 ML/MIN/1.73
GLUCOSE BLD-MCNC: 119 MG/DL (ref 74–99)
HCT VFR BLD CALC: 43.6 % (ref 37–54)
HEMOGLOBIN: 14.8 G/DL (ref 12.5–16.5)
IMMATURE GRANULOCYTES #: 0.1 E9/L
IMMATURE GRANULOCYTES %: 0.9 % (ref 0–5)
LV EF: 62 %
LVEF MODALITY: NORMAL
LYMPHOCYTES ABSOLUTE: 0.85 E9/L (ref 1.5–4)
LYMPHOCYTES RELATIVE PERCENT: 7.3 % (ref 20–42)
MAGNESIUM: 1.8 MG/DL (ref 1.6–2.6)
MCH RBC QN AUTO: 30.7 PG (ref 26–35)
MCHC RBC AUTO-ENTMCNC: 33.9 % (ref 32–34.5)
MCV RBC AUTO: 90.5 FL (ref 80–99.9)
MONOCYTES ABSOLUTE: 0.83 E9/L (ref 0.1–0.95)
MONOCYTES RELATIVE PERCENT: 7.1 % (ref 2–12)
NEUTROPHILS ABSOLUTE: 9.86 E9/L (ref 1.8–7.3)
NEUTROPHILS RELATIVE PERCENT: 84.4 % (ref 43–80)
PDW BLD-RTO: 13.1 FL (ref 11.5–15)
PHOSPHORUS: 1.9 MG/DL (ref 2.5–4.5)
PLATELET # BLD: 172 E9/L (ref 130–450)
PMV BLD AUTO: 10.4 FL (ref 7–12)
POTASSIUM SERPL-SCNC: 3.9 MMOL/L (ref 3.5–5)
PROCALCITONIN: 0.41 NG/ML (ref 0–0.08)
RBC # BLD: 4.82 E12/L (ref 3.8–5.8)
SODIUM BLD-SCNC: 135 MMOL/L (ref 132–146)
T4 FREE: 1.14 NG/DL (ref 0.93–1.7)
TOTAL PROTEIN: 6.7 G/DL (ref 6.4–8.3)
TSH SERPL DL<=0.05 MIU/L-ACNC: 2.04 UIU/ML (ref 0.27–4.2)
WBC # BLD: 11.7 E9/L (ref 4.5–11.5)

## 2022-10-31 PROCEDURE — 84145 PROCALCITONIN (PCT): CPT

## 2022-10-31 PROCEDURE — 93306 TTE W/DOPPLER COMPLETE: CPT

## 2022-10-31 PROCEDURE — 6370000000 HC RX 637 (ALT 250 FOR IP): Performed by: INTERNAL MEDICINE

## 2022-10-31 PROCEDURE — 36415 COLL VENOUS BLD VENIPUNCTURE: CPT

## 2022-10-31 PROCEDURE — 83735 ASSAY OF MAGNESIUM: CPT

## 2022-10-31 PROCEDURE — 84100 ASSAY OF PHOSPHORUS: CPT

## 2022-10-31 PROCEDURE — 2580000003 HC RX 258: Performed by: INTERNAL MEDICINE

## 2022-10-31 PROCEDURE — 84439 ASSAY OF FREE THYROXINE: CPT

## 2022-10-31 PROCEDURE — 6360000002 HC RX W HCPCS: Performed by: INTERNAL MEDICINE

## 2022-10-31 PROCEDURE — 80053 COMPREHEN METABOLIC PANEL: CPT

## 2022-10-31 PROCEDURE — 84443 ASSAY THYROID STIM HORMONE: CPT

## 2022-10-31 PROCEDURE — 85025 COMPLETE CBC W/AUTO DIFF WBC: CPT

## 2022-10-31 RX ORDER — LEVOTHYROXINE SODIUM 88 UG/1
88 TABLET ORAL DAILY
Status: DISCONTINUED | OUTPATIENT
Start: 2022-10-31 | End: 2022-10-31 | Stop reason: HOSPADM

## 2022-10-31 RX ORDER — LEVOFLOXACIN 750 MG/1
750 TABLET ORAL DAILY
Qty: 14 TABLET | Refills: 0 | Status: SHIPPED | OUTPATIENT
Start: 2022-10-31 | End: 2022-11-14

## 2022-10-31 RX ADMIN — LEVOTHYROXINE SODIUM 88 MCG: 0.09 TABLET ORAL at 08:11

## 2022-10-31 RX ADMIN — SODIUM CHLORIDE, PRESERVATIVE FREE 10 ML: 5 INJECTION INTRAVENOUS at 08:11

## 2022-10-31 RX ADMIN — ENOXAPARIN SODIUM 30 MG: 100 INJECTION SUBCUTANEOUS at 06:05

## 2022-10-31 RX ADMIN — ACETAMINOPHEN 650 MG: 325 TABLET ORAL at 08:11

## 2022-10-31 ASSESSMENT — PAIN SCALES - GENERAL: PAINLEVEL_OUTOF10: 0

## 2022-10-31 NOTE — DISCHARGE INSTRUCTIONS
Your information:  Name: Dayanara Bangura  : 1945    Your instructions:    YOU ARE BEING DISCHARGED HOME. PLEASE MAKE AND KEEP YOUR FOLLOW UP APPOINTMENTS. What to do after you leave the hospital:    Recommended diet: regular diet    Recommended activity: activity as tolerated    IF YOU EXPERIENCE ANY OF THE FOLLOWING SYMPTOMS, CHEST PAIN, SHORTNESS OF BREATH, COUGHING UP BLOOD OR BLOODY SPUTUM, STOMACH PAIN OR CRAMPING, DARK, TARRY STOOLS, LOSS OF APPETITE, GENERAL NOT FEELING WELL, SIGNS AND SYMPTOMS OF INFECTION LIKE FEVER AND OR CHILLS, PLEASE CALL DR Amrit Foster MD OR RETURN TO THE EMERGENCY ROOM. The following personal items were collected during your admission and were returned to you:    Belongings  Dental Appliances: None  Vision - Corrective Lenses: Eyeglasses  Hearing Aid: None  Clothing: Pants, Shirt, Footwear, Socks, Undergarments  Jewelry: Ring  Body Piercings Removed: N/A  Electronic Devices: Cell Phone  Weapons (Notify Protective Services/Security): None  Other Valuables: Wallet, At home  Home Medications: None  Valuables Given To: Patient  Provide Name(s) of Who Valuable(s) Were Given To: Adelita Ordonez Responsible person(s) in the waiting room: . Patient approves for provider to speak to responsible person post operatively: Yes    Information obtained by:  By signing below, I understand that if any problems occur once I leave the hospital I am to contact  Amrit Foster MD or go to emergency room . I understand and acknowledge receipt of the instructions indicated above.

## 2022-10-31 NOTE — PROGRESS NOTES
CLINICAL PHARMACY NOTE: MEDS TO BEDS    Total # of Prescriptions Filled: 1   The following medications were delivered to the patient:  Levofloxacin 750 mg    Additional Documentation:

## 2022-10-31 NOTE — CONSULTS
10/31/22  9:11 AM  Service: Urology  Group: [facility] (Ravi/Maxine/Trixie)    [facility]  [id number]     Chief Complaint:    UTI, chronic conrad, 200 grm Prostate    History of Present Illness: The patient is a 78 y.o. male patient who presents with dysuria, fever and chills. He is known to Dr Apryl Soto and has a chronic indwelling conrad catheter that was changed in the office 10/28/22. He is planning on a procedure in December at [facility] for his 200 gm prostate. He developed fever, chills and burning in his urethra so he presented to the urgent care. Temp as high as 101.3 here. Currently he is feeling ok and is comfortable with the catheter that is draining dark sandro urine. Past Medical History:   Diagnosis Date    COVID     Enlarged prostate     Hypothyroid          Past Surgical History:   Procedure Laterality Date    SKIN BIOPSY         Medications Prior to Admission:    Medications Prior to Admission: LEVOTHYROXINE SODIUM, 88 mEq by Does not apply route daily    Allergies:    Bactrim [sulfamethoxazole-trimethoprim], Lisinopril, and Pcn [penicillins]    Social History:    reports that he has never smoked. He has never used smokeless tobacco. He reports that he does not drink alcohol and does not use drugs. Family History:   Non-contributory to this Urological problem  family history is not on file.     Review of Systems:  Constitutional: + fever and chills  Respiratory: negative for cough and hemoptysis  Cardiovascular: negative for chest pain and dyspnea  Gastrointestinal: negative for abdominal pain, diarrhea, nausea and vomiting   Derm: negative for rash and skin lesion(s)  Neurological: negative for seizures and tremors  Musculoskeletal: negative  Endocrine: negative for diabetic symptoms including polydipsia and polyuria  Psychiatric: negative  : As above in the HPI, otherwise negative  All other reviews are negative    Physical Exam:     Vitals:  BP (!) 174/85   Pulse 96 Temp (!) 100.6 °F (38.1 °C) (Oral)   Resp 16   Ht 5' 10\" (1.778 m)   Wt 235 lb (106.6 kg)   SpO2 93%   BMI 33.72 kg/m²     General:  Awake, alert, oriented X 3. Well developed, well nourished, well groomed. No apparent distress. HEENT:  Normocephalic, atraumatic. Pupils equal, round. No scleral icterus. No conjunctival injection. Normal lips, teeth, and gums. No nasal discharge. Neck:  Supple, no masses. Heart:  RRR  Lungs:  No audible wheezing. Respirations symmetric and non-labored. Abdomen:  soft, nontender, no masses, no organomegaly, no peritoneal signs  Extremities:  No clubbing, cyanosis, or edema  Skin:  Warm and dry, no open lesions or rashes  Neuro: There are no motor or sensory deficits in the 4 quadrant extremities   Rectal: deferred  Genitalia:  conrad catheter appears well positioned     Labs:     Recent Labs     10/30/22  1311 10/30/22  1641 10/31/22  0624   WBC 13.7* 13.2* 11.7*   RBC 5.34 5.32 4.82   HGB 16.5 16.4 14.8   HCT 47.0 47.5 43.6   MCV 88.0 89.3 90.5   MCH 30.9 30.8 30.7   MCHC 35.1* 34.5 33.9   RDW 13.2 13.1 13.1    203 172   MPV 9.6 10.1 10.4         Recent Labs     10/30/22  1326 10/30/22  1641 10/31/22  0624   CREATININE 1.1 1.1 1.1        Latest Reference Range & Units 10/30/22 16:41   Color, UA Straw/Yellow  DKYELLOW   Clarity, UA Clear  CLOUDY ! Glucose, UA Negative mg/dL Negative   Bilirubin, Urine Negative  Negative   Ketones, Urine Negative mg/dL 15 ! Specific Gravity, UA 1.005 - 1.030  >=1.030   Blood, Urine Negative  LARGE !   pH, UA 5.0 - 9.0  5.5   Protein, UA Negative mg/dL 100 ! Urobilinogen, Urine <2.0 E.U./dL 0.2   Nitrite, Urine Negative  POSITIVE ! Leukocyte Esterase, Urine Negative  MODERATE ! WBC, UA 0 - 5 /HPF 10-20 ! RBC, UA 0 - 2 /HPF 2-5   Epithelial Cells, UA /HPF FEW   Bacteria, UA None Seen /HPF MANY !          Urine and blood cultures are pending    Assessment/plan:    BPH, BETTENCOURT  UTI  Neurogenic bladder managed with chronic catheter     Continue the catheter  He will be discharged with the catheter  Antibiotics per primary  Follow blood ad urine cultures  Will follow    [facility]           Electronically signed by VINCE Saldaña CNP on 10/31/2022 at 9:11 AM

## 2022-10-31 NOTE — PLAN OF CARE
Problem: Pain  Goal: Verbalizes/displays adequate comfort level or baseline comfort level  10/31/2022 0106 by Yonathan Leroy RN  Outcome: Progressing

## 2022-10-31 NOTE — ED NOTES
Nurse to nurse given to Kimmy Rivas, RN pt to go to 433. On 4th floor.       Reuben Babcock  10/30/22 2495

## 2022-10-31 NOTE — H&P
Department of Internal Medicine  History and Physical    PCP: Caitlyn Guardado MD  Admitting Physician: Dr. Dali Villalta  Consultants:   Date of Service: 10/30/2022    CHIEF COMPLAINT:  dysuria     HISTORY OF PRESENT ILLNESS:    Patient is a 68-year-old male who presented to the ED due to dysuria. Patient has history of enlarged prostate and secondary to this has been using a chronic Oconnor catheter for the last 6 months. States that he was changed this week. Following change of Oconnor catheter he developed dysuria. Discontinued and as such he presented to the ED for further evaluation and management. He does admit to subjective fever/chills. Denies any nausea or emesis. He denies any abdominal pain otherwise. He denies previous history of UTI. PAST MEDICAL Hx:  Past Medical History:   Diagnosis Date    COVID     Enlarged prostate     Hypothyroid        PAST SURGICAL Hx:   Past Surgical History:   Procedure Laterality Date    SKIN BIOPSY         FAMILY Hx:  History reviewed. No pertinent family history. HOME MEDICATIONS:  Prior to Admission medications    Medication Sig Start Date End Date Taking? Authorizing Provider   LEVOTHYROXINE SODIUM by Does not apply route.       Historical Provider, MD       ALLERGIES:  Bactrim [sulfamethoxazole-trimethoprim], Lisinopril, and Pcn [penicillins]    SOCIAL Hx:  Social History     Socioeconomic History    Marital status:      Spouse name: Not on file    Number of children: Not on file    Years of education: Not on file    Highest education level: Not on file   Occupational History    Not on file   Tobacco Use    Smoking status: Never    Smokeless tobacco: Never   Substance and Sexual Activity    Alcohol use: No    Drug use: No    Sexual activity: Not on file   Other Topics Concern    Not on file   Social History Narrative    Not on file     Social Determinants of Health     Financial Resource Strain: Not on file   Food Insecurity: Not on file Transportation Needs: Not on file   Physical Activity: Not on file   Stress: Not on file   Social Connections: Not on file   Intimate Partner Violence: Not on file   Housing Stability: Not on file       ROS: Positive in bold  General:   Denies chills, fatigue, fever, malaise, night sweats or weight loss    Psychological:   Denies anxiety, disorientation or hallucinations    ENT:    Denies epistaxis, headaches, vertigo or visual changes    Cardiovascular:   Denies any chest pain, irregular heartbeats, or palpitations. No paroxysmal nocturnal dyspnea. Respiratory:   Denies shortness of breath, coughing, sputum production, hemoptysis, or wheezing. No orthopnea. Gastrointestinal:   Denies nausea, vomiting, diarrhea, or constipation. Denies any abdominal pain. Denies change in bowel habits or stools. Genito-Urinary:    Denies any urgency, frequency, hematuria. Voiding without difficulty. dysuria    Musculoskeletal:   Denies joint pain, joint stiffness, joint swelling or muscle pain    Neurology:    Denies any headache or focal neurological deficits. No weakness or paresthesia. Derm:    Denies any rashes, ulcers, or excoriations. Denies bruising. Extremities:   Denies any lower extremity swelling or edema. PHYSICAL EXAM: Abnormal findings noted  VITALS:  Vitals:    10/30/22 1537   BP:    Pulse:    Resp:    Temp: 98.5 °F (36.9 °C)   SpO2:          CONSTITUTIONAL:    Awake, alert, cooperative, no apparent distress, and appears stated age    EYES:    EOMI, sclera clear, conjunctiva normal    ENT:    Normocephalic, atraumatic, External ears without lesions. NECK:    Supple, symmetrical, trachea midline, no JVD    HEMATOLOGIC/LYMPHATICS:    No cervical lymphadenopathy and no supraclavicular lymphadenopathy    LUNGS:    Symmetric.  No increased work of breathing, good air exchange, clear to auscultation bilaterally, no wheezes, rhonchi, or rales,     CARDIOVASCULAR:    Normal apical impulse, regular rate and rhythm, normal S1 and S2, no S3 or S4, and no murmur noted  Patient has a right upper sternal border systolic murmur    ABDOMEN:    soft, non-distended, non-tender    MUSCULOSKELETAL:    There is no redness, warmth, or swelling of the joints. NEUROLOGIC:    Awake, alert, oriented to name, place and time. SKIN:    No bruising or bleeding. No redness, warmth, or swelling    EXTREMITIES:    Peripheral pulses present. No edema, cyanosis, or swelling. LINES/CATHETERS   Oconnor catheter in place    LABORATORY DATA:  CBC with Differential:    Lab Results   Component Value Date/Time    WBC 13.2 10/30/2022 04:41 PM    RBC 5.32 10/30/2022 04:41 PM    HGB 16.4 10/30/2022 04:41 PM    HCT 47.5 10/30/2022 04:41 PM     10/30/2022 04:41 PM    MCV 89.3 10/30/2022 04:41 PM    MCH 30.8 10/30/2022 04:41 PM    MCHC 34.5 10/30/2022 04:41 PM    RDW 13.1 10/30/2022 04:41 PM    LYMPHOPCT 6.7 10/30/2022 04:41 PM    MONOPCT 6.4 10/30/2022 04:41 PM    BASOPCT 0.1 10/30/2022 04:41 PM    MONOSABS 0.85 10/30/2022 04:41 PM    LYMPHSABS 0.89 10/30/2022 04:41 PM    EOSABS 0.01 10/30/2022 04:41 PM    BASOSABS 0.01 10/30/2022 04:41 PM     CMP:    Lab Results   Component Value Date/Time     10/30/2022 04:41 PM    K 3.8 10/30/2022 04:41 PM     10/30/2022 04:41 PM    CO2 26 10/30/2022 04:41 PM    BUN 17 10/30/2022 04:41 PM    CREATININE 1.1 10/30/2022 04:41 PM    CREATININE 1.1 10/30/2022 01:26 PM    LABGLOM >60 10/30/2022 04:41 PM    GLUCOSE 145 10/30/2022 04:41 PM    PROT 7.4 10/30/2022 04:41 PM    LABALBU 4.3 10/30/2022 04:41 PM    CALCIUM 9.3 10/30/2022 04:41 PM    BILITOT 1.7 10/30/2022 04:41 PM    ALKPHOS 112 10/30/2022 04:41 PM    AST 25 10/30/2022 04:41 PM    ALT 29 10/30/2022 04:41 PM       ASSESSMENT/PLAN:  UTI  Enlarged  prostate  History of COVID-19  Hypothyroidism    Patient presented to patient found to have UTI. Patient started on IV Rocephin. Blood and urine cultures ordered.   Patient is scheduled to have procedure [facility] December. He does follow with urology locally. Urology will be consulted. Echocardiogram ordered to evaluate murmur.     Augustina Zacarias  8:14 PM  10/30/2022    Electronically signed by MARITA Diaz DO on 10/30/22 at 8:14 PM EDT

## 2022-10-31 NOTE — DISCHARGE SUMMARY
Internal Medicine Progress Note     [facility]     Lilly Johnson. Ayush Abbasi., [place].A.C.OCristopherI. Antonio Juarez D.O., JULIETAOWES Vasquez D.O. Tresa Benitez, MSN, APRN, NP-C  Prosper Anaya. Genaro Liu, MSN, [id number] [facility]       Internal Medicine  Discharge Summary    NAME: Anjana Monroy  :  1945  MRN:  [id number]  Salty Pressley MD  ADMITTED: 10/30/2022      DISCHARGED: 10/31/22    ADMITTING PHYSICIAN: Teresa Luther DO    CONSULTANT(S):   IP CONSULT TO INTERNAL MEDICINE  IP CONSULT TO UROLOGY     ADMITTING DIAGNOSIS:   [facility]eps[facility] [A41.9, N39.0]  Urinary tract infection associated with indwelling urethral catheter, initial encounter ([facility] [V57.320K, N39.0]     DISCHARGE DIAGNOSES:   Catheter associated urinary tract infection with possible bacteremia discharged on Levaquin x2 weeks  Benign prostatic hypertrophy with chronic urinary retention and resultant neurogenic bladder with conrad catheter in place since 2022 with plans for urologic procedure as an outpatient  Hypothyroidism    BRIEF HISTORY OF PRESENT ILLNESS:   Patient is a 81-year-old male who presented to the ED due to dysuria. Patient has history of enlarged prostate and secondary to this has been using a chronic Conrad catheter for the last 6 months. States that he was changed this week. Following change of Conrad catheter he developed dysuria. Discontinued and as such he presented to the ED for further evaluation and management. He does admit to subjective fever/chills. Denies any nausea or emesis. He denies any abdominal pain otherwise. He denies previous history of UTI.     LABS[de-identified]  Lab Results   Component Value Date    WBC 11.7 (H) 10/31/2022    HGB 14.8 10/31/2022    HCT 43.6 10/31/2022     10/31/2022     10/31/2022    K 3.9 10/31/2022     10/31/2022    CREATININE 1.1 10/31/2022    BUN 15 10/31/2022    CO2 25 10/31/2022    GLUCOSE 119 (H) 10/31/2022    ALT 55 (H) 10/31/2022    AST 37 10/31/2022     No results found for: INR, PROTIME   Lab Results   Component Value Date    TSH 2.040 10/31/2022     No results found for: TRIG  No results found for: HDL  No results found for: LDLCALC  No results found for: LABA1C    IMAGING:  No results found. HOSPITAL COURSE:   Gallo Holland did well throughout his brief hospitalization. He was admitted last evening due to concern for catheter associated UTI. Gallo Holland has chronic catheter that was exchanged on Friday,10/28/2022 at the urologist office. He developed dysuria following. Initial work-up suggested catheter associated urinary tract infection. He is nontoxic, well-appearing and is appropriate for discharge. He understands that bacteremia may be present however he has responded well and will be appropriately transition to oral antimicrobials as though he has bacteremia. He will be prescribed Levaquin 750 mg daily x14 days. We will follow-up on culture results and call Gallo Holland on Wednesday to advise him of the results. We will shorten course of antibiotics based upon culture results if not bacteremic. 10-day course will be utilized at that point. He understands importance of close outpatient primary care and urologic follow-up. Patient is medically stable and acceptable for discharge today. BRIEF PHYSICAL EXAMINATION AND LABORATORIES ON DAY OF DISCHARGE:  VITALS:  BP (!) 174/85   Pulse 96   Temp (!) 100.6 °F (38.1 °C) (Oral)   Resp 16   Ht 5' 10\" (1.778 m)   Wt 235 lb (106.6 kg)   SpO2 93%   BMI 33.72 kg/m²     HEENT:  PERRLA. EOMI. Sclera clear. Buccal mucosa moist.    Neck:  Supple. Trachea midline. No thyromegaly. No JVD. No bruits. Heart:  Rhythm regular, rate controlled. Systolic murmur. Lungs:  Symmetrical. Clear to auscultation bilaterally. No wheezes. No rhonchi. No rales. Abdomen: Soft. Non-tender. Non-distended. Bowel sounds positive. No organomegaly or masses.   No pain on palpation. Oconnor catheter is in place draining yellow clear urine without sediment, hematuria or pyuria. Extremities:  Peripheral pulses present. No peripheral edema. No ulcers. Neurologic:  Alert x 3. No focal deficit. Cranial nerves grossly intact. Skin:  No petechia. No hemorrhage. No wounds. DISPOSITION:  The patient's condition is stable. At this time the patient is without objective evidence of an acute process requiring continuing hospitalization or inpatient management. They are stable for discharge with outpatient follow-up. I have spoken with the patient and discussed the results of the current hospitalization, in addition to providing specific details for the plan of care and counseling regarding the diagnosis and prognosis. The plan has been discussed in detail and they are aware of the specific conditions for emergent return, as well as the importance of follow-up. Their questions are answered at this time and they are agreeable with the plan for discharge to home    DISCHARGE MEDICATIONS:   Current Discharge Medication List             Details   levoFLOXacin (LEVAQUIN) 750 MG tablet Take 1 tablet by mouth daily for 14 days  Qty: 14 tablet, Refills: 0                Details   LEVOTHYROXINE SODIUM 88 mEq by Does not apply route daily             FOLLOW UP/INSTRUCTIONS:  This patient is instructed to follow-up with his primary care physician. Patient is instructed to follow-up with the consults listed above as directed by them. he is instructed to resume home medications and take new medications as indicated in the list above. If the patient has a recurrence of symptoms, he is instructed to go to the ED. Preparing for this patient's discharge, including paperwork, orders, instructions, and meeting with patient did require > 40 minutes.     VINCE Ford CNP     10/31/2022  9:27 AM

## 2022-11-01 LAB — URINE CULTURE, ROUTINE: NORMAL

## 2022-11-04 LAB
BLOOD CULTURE, ROUTINE: NORMAL
CULTURE, BLOOD 2: NORMAL

## 2022-11-04 NOTE — PROGRESS NOTES
Physician Progress Note      PATIENT:               Kelly Hyde  CSN #:                  [id number]  :                       1945  ADMIT DATE:       10/30/2022 3:41 PM  [address] DATE:        10/31/2022 12:27 PM  RESPONDING  PROVIDER #:        Merlinda Gust Black DO          QUERY TEXT:    Pt admitted with Catheter associated urinary tract infection with possible   bacteremia . Pt noted per h/p and dc summary to have an admitting dx of Sepsis. If possible, please document in the progress notes and discharge summary if   you are evaluating and /or treating any of the following: The medical record reflects the following:  Risk Factors: Oconnor catheter changed 2 days prior to admission, UTI, BPH  Clinical Indicators: temp Max temp 100.6, WBC 13.7-11.7, Pulse 111-96,   Procalcitonin 0.41,  Per h/p Admits to subjective chills and fever prior to   admission  Per Ed note \" Sepsis\"  Treatment: urine c/s ,blood c/s., IVF bolus,  rocephin, dc home levaquin 2   weeks    Thank you,  Puneet Mitchell RN BSN CCDS  Options provided:  -- Sepsis, present on admission  -- UTI without Sepsis  -- Other - I will add my own diagnosis  -- Disagree - Not applicable / Not valid  -- Disagree - Clinically unable to determine / Unknown  -- Refer to Clinical Documentation Reviewer    PROVIDER RESPONSE TEXT:    This patient has sepsis which was present on admission.     Query created by: Aguilar Linares on 2022 11:45 AM      Electronically signed by:  Sandi Blankenship DO 2022 2:24 PM

## 2023-10-31 PROBLEM — N40.1 INCOMPLETE EMPTYING OF BLADDER DUE TO BENIGN PROSTATIC HYPERPLASIA: Status: ACTIVE | Noted: 2023-10-31

## 2023-10-31 PROBLEM — F41.9 ANXIETY DUE TO INVASIVE PROCEDURE: Status: ACTIVE | Noted: 2023-10-31

## 2023-10-31 PROBLEM — R33.9 INCOMPLETE EMPTYING OF BLADDER DUE TO BENIGN PROSTATIC HYPERPLASIA: Status: ACTIVE | Noted: 2023-10-31

## 2023-10-31 PROBLEM — R33.9 URINARY RETENTION: Status: ACTIVE | Noted: 2023-10-31

## 2023-10-31 RX ORDER — LEVOTHYROXINE SODIUM 88 UG/1
TABLET ORAL
COMMUNITY
Start: 2022-08-18

## 2023-10-31 RX ORDER — NITROFURANTOIN 25; 75 MG/1; MG/1
CAPSULE ORAL
COMMUNITY
Start: 2022-09-14

## 2023-10-31 RX ORDER — CIPROFLOXACIN 500 MG/1
1 TABLET ORAL 2 TIMES DAILY
COMMUNITY
Start: 2022-10-12

## 2023-10-31 RX ORDER — TADALAFIL 20 MG/1
20 TABLET ORAL DAILY PRN
COMMUNITY

## 2023-10-31 RX ORDER — SILDENAFIL 100 MG/1
100 TABLET, FILM COATED ORAL DAILY PRN
COMMUNITY

## 2023-10-31 RX ORDER — IMIQUIMOD 12.5 MG/.25G
CREAM TOPICAL
COMMUNITY
Start: 2022-04-08

## 2023-10-31 RX ORDER — LEVOTHYROXINE SODIUM 100 UG/1
TABLET ORAL
COMMUNITY

## 2023-10-31 RX ORDER — FINASTERIDE 5 MG/1
1 TABLET, FILM COATED ORAL DAILY
COMMUNITY
Start: 2022-08-29

## 2023-11-01 ENCOUNTER — TELEMEDICINE (OUTPATIENT)
Dept: UROLOGY | Facility: HOSPITAL | Age: 78
End: 2023-11-01
Payer: MEDICARE

## 2023-11-01 DIAGNOSIS — N40.1 INCOMPLETE EMPTYING OF BLADDER DUE TO BENIGN PROSTATIC HYPERPLASIA: Primary | ICD-10-CM

## 2023-11-01 DIAGNOSIS — R33.9 INCOMPLETE EMPTYING OF BLADDER DUE TO BENIGN PROSTATIC HYPERPLASIA: Primary | ICD-10-CM

## 2023-11-01 DIAGNOSIS — N52.8 OTHER MALE ERECTILE DYSFUNCTION: ICD-10-CM

## 2023-11-01 PROCEDURE — 99213 OFFICE O/P EST LOW 20 MIN: CPT | Mod: 95 | Performed by: UROLOGY

## 2023-11-01 PROCEDURE — 99213 OFFICE O/P EST LOW 20 MIN: CPT | Performed by: UROLOGY

## 2023-11-08 ENCOUNTER — TELEPHONE (OUTPATIENT)
Dept: UROLOGY | Facility: HOSPITAL | Age: 78
End: 2023-11-08

## 2024-10-31 NOTE — PROGRESS NOTES
HPI    79 yrs old male with 206g of prostate gland without concerning lesions on MRI, retention since 7/2022, now s/p outpatient HoLEP 12/28/22.     Path - 157g, benign prostatic tissue     12/30/22 - TOV, 250cc in, 250cc out.      1/16/23 - PVR 0cc. Doing well. Stream is strong, emptying bladder well. Dry. Denies any urgency or frequency. Minimal leakage on the way to the bathroom from time to time, but denies STU. No erections since post op. Would like to try medication. Does not take any nitrates. Thrilled with his outcome. started on sildenafil 100mg prn for ED     Frequency: none  Urgency: none  Hematuria: none  Post void dribbling: none  STU: none  UUI: very minimal  Meds: none      4/17/23 - follows up for 3 mo follow up. stream good, waking up 1x a night. very happy with his outcome. no leakage. pad free after first month. erections are infrequent, feels like his issue is more testosterone-driven than anything. sildenafil working well.      8/2/23 - voiding very well, wakes up x1, voids x2 during the day. strong stream. dry. thrilled with his outcome. erections continue to be problematic. sildenafil working here and there. notes when he has a full stomach things are worse.      11/01/2023 - seen today via thv for 3mo fuv after switching to tadalafil 20mg for his ED. Has not tried tadalafil, has continued sildenafil. 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 Doing very well from urinary standpoint, no concerns, very happy with his results.    11/06/24 - Seen today over telehealth, performed this visit using real-time telehealth tools, including an audio/video connection between Tristan Leigh at home and Joaquin Diamond MD at [facility]. Consent for telemedicine visit was obtained. Doing well overall, no urinary issues or concerns. Meds are working off and on for erections. Ok where he is at.          Current Medications:  Current Outpatient Medications   Medication Sig Dispense Refill    ciprofloxacin (Cipro) 500 mg  tablet Take 1 tablet (500 mg) by mouth 2 times a day.      finasteride (Proscar) 5 mg tablet Take 1 tablet (5 mg) by mouth once daily.      imiquimod (Aldara) 5 % cream Imiquimod 5 % External Cream   Quantity: 24  Refills: 0        Start : 8-Apr-2022   Active      levothyroxine (Synthroid, Levoxyl) 100 mcg tablet 1 tab(s) oral once a day      levothyroxine (Synthroid, Levoxyl) 88 mcg tablet Levothyroxine Sodium 88 MCG Oral Tablet   Quantity: 90  Refills: 0        Start : 18-Aug-2022   Active      nitrofurantoin, macrocrystal-monohydrate, (Macrobid) 100 mg capsule TAKE 1 CAPSULE Once Please take one hour prior to cystoscopy      sildenafil (Viagra) 100 mg tablet Take 1 tablet (100 mg) by mouth once daily as needed for erectile dysfunction.      tadalafil 20 mg tablet Take 1 tablet (20 mg) by mouth once daily as needed for erectile dysfunction.       No current facility-administered medications for this visit.        Active Problems:  Tristan Leigh is a 79 y.o. male with the following Problems and Medications.  Patient Active Problem List   Diagnosis    Anxiety due to invasive procedure    Incomplete emptying of bladder due to benign prostatic hyperplasia    Urinary retention     Current Outpatient Medications   Medication Sig Dispense Refill    ciprofloxacin (Cipro) 500 mg tablet Take 1 tablet (500 mg) by mouth 2 times a day.      finasteride (Proscar) 5 mg tablet Take 1 tablet (5 mg) by mouth once daily.      imiquimod (Aldara) 5 % cream Imiquimod 5 % External Cream   Quantity: 24  Refills: 0        Start : 8-Apr-2022   Active      levothyroxine (Synthroid, Levoxyl) 100 mcg tablet 1 tab(s) oral once a day      levothyroxine (Synthroid, Levoxyl) 88 mcg tablet Levothyroxine Sodium 88 MCG Oral Tablet   Quantity: 90  Refills: 0        Start : 18-Aug-2022   Active      nitrofurantoin, macrocrystal-monohydrate, (Macrobid) 100 mg capsule TAKE 1 CAPSULE Once Please take one hour prior to cystoscopy      sildenafil (Viagra)  100 mg tablet Take 1 tablet (100 mg) by mouth once daily as needed for erectile dysfunction.      tadalafil 20 mg tablet Take 1 tablet (20 mg) by mouth once daily as needed for erectile dysfunction.       No current facility-administered medications for this visit.       PMH:  No past medical history on file.    PSH:  No past surgical history on file.    FMH:  No family history on file.    SHx:       Allergies:  Allergies   Allergen Reactions    Lisinopril Other    Penicillins Other    Sulfamethoxazole-Trimethoprim Other       Assessment/Plan  Doing well overall, no urinary concerns or issues. Has been taking medication on and off for erections. Happy where he is at. Discussed injections vs implant, not interested for now.    Follow up in 1 year or sooner prn.       Scribe Attestation  By signing my name below, I, Josias Rivas, attest that this documentation  has been prepared under the direction and in the presence of Joaquin Diamond MD.

## 2024-11-06 ENCOUNTER — TELEMEDICINE (OUTPATIENT)
Dept: UROLOGY | Facility: HOSPITAL | Age: 79
End: 2024-11-06
Payer: MEDICARE

## 2024-11-06 DIAGNOSIS — N52.8 OTHER MALE ERECTILE DYSFUNCTION: ICD-10-CM

## 2024-11-06 DIAGNOSIS — N40.1 INCOMPLETE EMPTYING OF BLADDER DUE TO BENIGN PROSTATIC HYPERPLASIA: Primary | ICD-10-CM

## 2024-11-06 DIAGNOSIS — R33.9 INCOMPLETE EMPTYING OF BLADDER DUE TO BENIGN PROSTATIC HYPERPLASIA: Primary | ICD-10-CM

## 2024-11-06 PROCEDURE — 51798 US URINE CAPACITY MEASURE: CPT | Performed by: UROLOGY

## 2024-11-06 PROCEDURE — 99214 OFFICE O/P EST MOD 30 MIN: CPT | Performed by: UROLOGY

## 2024-11-06 PROCEDURE — G2211 COMPLEX E/M VISIT ADD ON: HCPCS | Performed by: UROLOGY

## 2025-03-05 ENCOUNTER — PREP FOR PROCEDURE (OUTPATIENT)
Dept: SURGERY | Age: 80
End: 2025-03-05

## 2025-03-05 ENCOUNTER — OFFICE VISIT (OUTPATIENT)
Dept: SURGERY | Age: 80
End: 2025-03-05
Payer: MEDICARE

## 2025-03-05 VITALS
HEIGHT: 70 IN | DIASTOLIC BLOOD PRESSURE: 102 MMHG | HEART RATE: 86 BPM | BODY MASS INDEX: 33.64 KG/M2 | WEIGHT: 235 LBS | SYSTOLIC BLOOD PRESSURE: 165 MMHG | RESPIRATION RATE: 16 BRPM

## 2025-03-05 DIAGNOSIS — L72.0 EPIDERMOID CYST OF SKIN OF BACK: ICD-10-CM

## 2025-03-05 DIAGNOSIS — D17.1 LIPOMA OF BACK: Primary | ICD-10-CM

## 2025-03-05 DIAGNOSIS — D17.1 LIPOMA OF BACK: ICD-10-CM

## 2025-03-05 PROCEDURE — 99204 OFFICE O/P NEW MOD 45 MIN: CPT | Performed by: SURGERY

## 2025-03-05 PROCEDURE — 1036F TOBACCO NON-USER: CPT | Performed by: SURGERY

## 2025-03-05 PROCEDURE — 1123F ACP DISCUSS/DSCN MKR DOCD: CPT | Performed by: SURGERY

## 2025-03-05 PROCEDURE — G8417 CALC BMI ABV UP PARAM F/U: HCPCS | Performed by: SURGERY

## 2025-03-05 PROCEDURE — 1159F MED LIST DOCD IN RCRD: CPT | Performed by: SURGERY

## 2025-03-05 PROCEDURE — G8427 DOCREV CUR MEDS BY ELIG CLIN: HCPCS | Performed by: SURGERY

## 2025-03-05 NOTE — PROGRESS NOTES
06:24 AM    CO2 25 10/31/2022 06:24 AM    BUN 15 10/31/2022 06:24 AM    CREATININE 1.1 10/31/2022 06:24 AM    LABGLOM >60 10/31/2022 06:24 AM    GLUCOSE 119 10/31/2022 06:24 AM    CALCIUM 8.9 10/31/2022 06:24 AM    BILITOT 1.7 10/31/2022 06:24 AM    ALKPHOS 122 10/31/2022 06:24 AM    AST 37 10/31/2022 06:24 AM    ALT 55 10/31/2022 06:24 AM      -3 cm lower back cyst-patient states that he feels the cyst is growing.  It was infected in the past and patient underwent I&D.  He was sent here by his dermatologist-Dr. Patino for evaluation.  i explained the plan to excise the area of the skin lesion.   The specimen will be sent to pathology.     I discussed the risk of  Bleeding, infection, and recurrence. The patient acknowledges these risks and wishes to proceed with the surgery.     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  He is more symptomatic  I explained to him that this is most likely a lipoma, which is a benign fatty tumor  He wishes to have it removed  I spoke to the patient about excising the upper back lipoma  I discussed with the patient the risk of bleeding, infection and recurrence.  I also discussed the risk of sedation.  I stated that with a large lipoma like this after is removed, there is a high risk for seroma formation since the dead space will follow up with fluid.  This will go go away over the course of several months.  He acknowledges these risks and wishes to proceed       --Schedule excision of lowr back cyst and upper back lipoma in May 2025  He wants to wait till after his trip to [place] on March 27        Ivan Golden MD, FACS  3/5/2025  2:26 PM     NOTE: This report was transcribed using voice recognition software. Every effort was made to ensure accuracy; however, inadvertent computerized transcription errors may be present.

## 2025-03-15 ENCOUNTER — HOSPITAL ENCOUNTER (EMERGENCY)
Age: 80
Discharge: HOME | End: 2025-03-15
Payer: MEDICARE

## 2025-03-15 VITALS
TEMPERATURE: 97.8 F | WEIGHT: 235 LBS | OXYGEN SATURATION: 99 % | SYSTOLIC BLOOD PRESSURE: 141 MMHG | RESPIRATION RATE: 20 BRPM | DIASTOLIC BLOOD PRESSURE: 99 MMHG | BODY MASS INDEX: 33.72 KG/M2 | HEART RATE: 83 BPM

## 2025-03-15 DIAGNOSIS — M17.12 ARTHRITIS OF LEFT KNEE: Primary | ICD-10-CM

## 2025-03-15 PROCEDURE — 99211 OFF/OP EST MAY X REQ PHY/QHP: CPT

## 2025-03-15 RX ORDER — PREDNISONE 20 MG/1
20 TABLET ORAL 2 TIMES DAILY
Qty: 10 TABLET | Refills: 0 | Status: SHIPPED | OUTPATIENT
Start: 2025-03-15 | End: 2025-03-20

## 2025-03-15 ASSESSMENT — PAIN - FUNCTIONAL ASSESSMENT: PAIN_FUNCTIONAL_ASSESSMENT: NONE - DENIES PAIN

## 2025-03-15 NOTE — ED PROVIDER NOTES
[facility] Visit.        [facility] URGENT CARE  ED  Encounter Note  Admit Date/RoomTime: 3/15/2025 10:21 AM  ED Room:   NAME: Oscar Nash  : 1945  MRN: [id number]  PCP: Celso Daniel MD    CHIEF COMPLAINT     Knee Pain (Left knee pain, seen and treated Thursday at [facility], xrays showed arthritis, no acute problem, told to F/U with them and he came here because he is going out of the country, concerned for gout)    HISTORY OF PRESENT ILLNESS        Oscar Nash is a 79 y.o. male who presents to the ED with complaints of left knee pain that has been ongoing for the past 3 weeks.  Patient states he was seen at [facility] 2 days ago and was told he of arthritis.  Patient states he was told anti-inflammatories and follow-up with [facility] if it does not improve.  Patient states he went home and believes he may have gout.  Patient presents here today to see if he has gout.  Patient denies fever or chills.  Patient states he is going out of the country next week and wants significant improvement before he leaves on vacation to [place].  Patient states mild swelling of the left knee without redness or ecchymosis.    REVIEW OF SYSTEMS     Pertinent positives and negatives are stated within HPI, all other systems reviewed and are negative.    Past Medical History:  has a past medical history of COVID, Enlarged prostate, and Hypothyroid.  Surgical History:  has a past surgical history that includes skin biopsy.  Social History:  reports that he has never smoked. He has never used smokeless tobacco. He reports that he does not drink alcohol and does not use drugs.  Family History: family history is not on file.   Allergies: Bactrim [sulfamethoxazole-trimethoprim], Lisinopril, and Pcn [penicillins]  CURRENT MEDICATIONS       Previous Medications    LEVOTHYROXINE SODIUM    88 mEq by Does not apply route daily       SCREENINGS               CIWA Assessment  BP: (!)

## 2025-05-13 NOTE — PROGRESS NOTES
[facility]   PRE-ADMISSION TESTING GENERAL INSTRUCTIONS  PAT Phone Number: [phone]      GENERAL INSTRUCTIONS:    [x] Antibacterial Soap Shower Night before AND the Morning of procedure.  [] The Night Before Surgery: Take an antibacterial soap shower - followed by CHG Wipes.   -The Morning of Surgery: Repeat CHG Wipes.  [x] Do not wear makeup, lotions, powders, deodorant the morning of surgery.  [x] No solid food after midnight. You may have SIPS of clear liquids up until 2 hours before your arrival time to the hospital.   [x] You may brush your teeth, gargle, but do not swallow water.   [x] No tobacco products, illegal drugs, or alcohol within 24 hours of your surgery.  [x] Jewelry or valuables should not be brought to the hospital. All body and/or tongue piercing's must be removed prior to arriving to hospital. No contact lens or hair pins.   [x] Arrange transportation with a responsible adult  to and from the hospital. Arrange for someone to be with you for the remainder of the day and for 24 hours after your procedure due to having had anesthesia.          -Who will be your  for transportation? Isabela         -Who will be staying with you for 24 hrs after your procedure? Isabela  [x] Bring insurance card and photo ID.  [x] Bring copy of living will or healthcare power of  papers to be placed in your electronic record.  [] Urine Pregnancy test will be preformed the day of surgery. A specimen sample may be brought from home.  [] Transfusion (Green) Bracelet: Please bring with you to hospital, day of surgery.     PARKING INSTRUCTIONS:     [x] ARRIVAL DATE & TIME: 5/19 @ 0530am  [x] Times are subject to change. We will contact you the business day before surgery if that were to occur.  [x] Enter into the [facility] Entrance. Two people may accompany you. Masks are not required.  [x] Parking Lot \"I\" is where you will park. It is located on the corner of [address]

## 2025-05-18 ENCOUNTER — ANESTHESIA EVENT (OUTPATIENT)
Dept: OPERATING ROOM | Age: 80
End: 2025-05-18
Payer: MEDICARE

## 2025-05-19 ENCOUNTER — HOSPITAL ENCOUNTER (OUTPATIENT)
Age: 80
Setting detail: OUTPATIENT SURGERY
Discharge: HOME | End: 2025-05-19
Attending: SURGERY | Admitting: SURGERY
Payer: MEDICARE

## 2025-05-19 ENCOUNTER — ANESTHESIA (OUTPATIENT)
Dept: OPERATING ROOM | Age: 80
End: 2025-05-19
Payer: MEDICARE

## 2025-05-19 VITALS
OXYGEN SATURATION: 97 % | WEIGHT: 235 LBS | SYSTOLIC BLOOD PRESSURE: 177 MMHG | HEIGHT: 70 IN | RESPIRATION RATE: 16 BRPM | TEMPERATURE: 97 F | HEART RATE: 58 BPM | DIASTOLIC BLOOD PRESSURE: 79 MMHG | BODY MASS INDEX: 33.64 KG/M2

## 2025-05-19 DIAGNOSIS — D17.1 LIPOMA OF BACK: ICD-10-CM

## 2025-05-19 PROCEDURE — 3700000000 HC ANESTHESIA ATTENDED CARE: Performed by: SURGERY

## 2025-05-19 PROCEDURE — 21933 EXC BACK TUM DEEP 5 CM/>: CPT | Performed by: SURGERY

## 2025-05-19 PROCEDURE — 3600000004 HC SURGERY LEVEL 4 BASE: Performed by: SURGERY

## 2025-05-19 PROCEDURE — 3600000014 HC SURGERY LEVEL 4 ADDTL 15MIN: Performed by: SURGERY

## 2025-05-19 PROCEDURE — 6360000002 HC RX W HCPCS: Performed by: SURGERY

## 2025-05-19 PROCEDURE — 3700000001 HC ADD 15 MINUTES (ANESTHESIA): Performed by: SURGERY

## 2025-05-19 PROCEDURE — 88304 TISSUE EXAM BY PATHOLOGIST: CPT

## 2025-05-19 PROCEDURE — 6370000000 HC RX 637 (ALT 250 FOR IP): Performed by: SURGERY

## 2025-05-19 PROCEDURE — 2580000003 HC RX 258: Performed by: SURGERY

## 2025-05-19 PROCEDURE — 2709999900 HC NON-CHARGEABLE SUPPLY: Performed by: SURGERY

## 2025-05-19 PROCEDURE — 7100000011 HC PHASE II RECOVERY - ADDTL 15 MIN: Performed by: SURGERY

## 2025-05-19 PROCEDURE — 6360000002 HC RX W HCPCS

## 2025-05-19 PROCEDURE — 11406 EXC TR-EXT B9+MARG >4.0 CM: CPT | Performed by: SURGERY

## 2025-05-19 PROCEDURE — 7100000010 HC PHASE II RECOVERY - FIRST 15 MIN: Performed by: SURGERY

## 2025-05-19 PROCEDURE — 88305 TISSUE EXAM BY PATHOLOGIST: CPT

## 2025-05-19 PROCEDURE — 2500000003 HC RX 250 WO HCPCS: Performed by: SURGERY

## 2025-05-19 PROCEDURE — 12032 INTMD RPR S/A/T/EXT 2.6-7.5: CPT | Performed by: SURGERY

## 2025-05-19 RX ORDER — SODIUM CHLORIDE 0.9 % (FLUSH) 0.9 %
5-40 SYRINGE (ML) INJECTION EVERY 12 HOURS SCHEDULED
Status: DISCONTINUED | OUTPATIENT
Start: 2025-05-19 | End: 2025-05-19 | Stop reason: HOSPADM

## 2025-05-19 RX ORDER — OXYCODONE HYDROCHLORIDE 5 MG/1
5 TABLET ORAL EVERY 6 HOURS PRN
Qty: 5 TABLET | Refills: 0 | Status: SHIPPED | OUTPATIENT
Start: 2025-05-19 | End: 2025-05-20

## 2025-05-19 RX ORDER — SODIUM CHLORIDE 0.9 % (FLUSH) 0.9 %
5-40 SYRINGE (ML) INJECTION PRN
Status: DISCONTINUED | OUTPATIENT
Start: 2025-05-19 | End: 2025-05-19 | Stop reason: HOSPADM

## 2025-05-19 RX ORDER — MIDAZOLAM HYDROCHLORIDE 1 MG/ML
INJECTION, SOLUTION INTRAMUSCULAR; INTRAVENOUS
Status: DISCONTINUED | OUTPATIENT
Start: 2025-05-19 | End: 2025-05-19 | Stop reason: SDUPTHER

## 2025-05-19 RX ORDER — PROPOFOL 10 MG/ML
INJECTION, EMULSION INTRAVENOUS
Status: DISCONTINUED | OUTPATIENT
Start: 2025-05-19 | End: 2025-05-19 | Stop reason: SDUPTHER

## 2025-05-19 RX ORDER — ONDANSETRON 2 MG/ML
INJECTION INTRAMUSCULAR; INTRAVENOUS
Status: DISCONTINUED | OUTPATIENT
Start: 2025-05-19 | End: 2025-05-19 | Stop reason: SDUPTHER

## 2025-05-19 RX ORDER — FENTANYL CITRATE 50 UG/ML
INJECTION, SOLUTION INTRAMUSCULAR; INTRAVENOUS
Status: DISCONTINUED | OUTPATIENT
Start: 2025-05-19 | End: 2025-05-19 | Stop reason: SDUPTHER

## 2025-05-19 RX ORDER — SODIUM CHLORIDE 9 MG/ML
INJECTION, SOLUTION INTRAVENOUS CONTINUOUS
Status: DISCONTINUED | OUTPATIENT
Start: 2025-05-19 | End: 2025-05-19 | Stop reason: HOSPADM

## 2025-05-19 RX ORDER — SODIUM CHLORIDE 9 MG/ML
INJECTION, SOLUTION INTRAVENOUS PRN
Status: DISCONTINUED | OUTPATIENT
Start: 2025-05-19 | End: 2025-05-19 | Stop reason: HOSPADM

## 2025-05-19 RX ORDER — BACITRACIN ZINC 500 [USP'U]/G
OINTMENT TOPICAL PRN
Status: DISCONTINUED | OUTPATIENT
Start: 2025-05-19 | End: 2025-05-19 | Stop reason: ALTCHOICE

## 2025-05-19 RX ADMIN — FENTANYL CITRATE 25 MCG: 50 INJECTION, SOLUTION INTRAMUSCULAR; INTRAVENOUS at 08:00

## 2025-05-19 RX ADMIN — FENTANYL CITRATE 50 MCG: 50 INJECTION, SOLUTION INTRAMUSCULAR; INTRAVENOUS at 07:32

## 2025-05-19 RX ADMIN — FENTANYL CITRATE 25 MCG: 50 INJECTION, SOLUTION INTRAMUSCULAR; INTRAVENOUS at 08:46

## 2025-05-19 RX ADMIN — SODIUM CHLORIDE: 9 INJECTION, SOLUTION INTRAVENOUS at 07:15

## 2025-05-19 RX ADMIN — PROPOFOL 100 MCG/KG/MIN: 10 INJECTION, EMULSION INTRAVENOUS at 07:25

## 2025-05-19 RX ADMIN — MIDAZOLAM 2 MG: 1 INJECTION INTRAMUSCULAR; INTRAVENOUS at 07:25

## 2025-05-19 RX ADMIN — FENTANYL CITRATE 50 MCG: 50 INJECTION, SOLUTION INTRAMUSCULAR; INTRAVENOUS at 07:41

## 2025-05-19 RX ADMIN — ONDANSETRON 4 MG: 2 INJECTION, SOLUTION INTRAMUSCULAR; INTRAVENOUS at 08:21

## 2025-05-19 RX ADMIN — SODIUM CHLORIDE: 9 INJECTION, SOLUTION INTRAVENOUS at 06:01

## 2025-05-19 RX ADMIN — CEFAZOLIN 2000 MG: 1 INJECTION, POWDER, FOR SOLUTION INTRAMUSCULAR; INTRAVENOUS at 07:27

## 2025-05-19 ASSESSMENT — PAIN - FUNCTIONAL ASSESSMENT: PAIN_FUNCTIONAL_ASSESSMENT: NONE - DENIES PAIN

## 2025-05-19 ASSESSMENT — LIFESTYLE VARIABLES: SMOKING_STATUS: 0

## 2025-05-19 NOTE — PROGRESS NOTES
CLINICAL PHARMACY NOTE: MEDS TO BEDS    Total # of Prescriptions Filled: 1   The following medications were delivered to the patient:  Oxycodone 5 mg    Additional Documentation: wife Isabela picked up in pharmacy

## 2025-05-19 NOTE — DISCHARGE INSTRUCTIONS
SURGERY DISCHARGE INSTRUCTIONS    You may be drowsy or lightheaded after receiving sedation or anesthesia.    A responsible person should be with you for the next 24 hours.    FOLLOW UP: Call office to schedule follow-up appointment in 2 weeks.    DIET: Advance your diet as tolerated. Start with light diet and progress to your normal diet as you feel like eating. If you experience nausea or repeated episodes of vomiting which persist beyond 12-24 hours, notify your doctor.    ACTIVITY: Rest today. Increase activity gradually. No driving for 1 day. No driving while on prescription pain medication.     SHOWER/BATHING: Okay to shower in 24 hours after procedure. No tub bathing, swimming or soaking for 2 weeks.    WOUND CARE: You have a clean dressing applied. You may remove this dressing in 24 hours. You do not need a new dressing but may apply one if your feel that your incisions are oozing. You have Dermabond dressing (skin glue) applied to your incisions. You do not need to apply anything over them. The glue will gradually work itself off over the next few weeks. Avoid directly applying lotions, creams or oils to your incision. Keep incisions clean and dry. Always ensure you and your care giver clean hands before and after caring for the wound. You may place ice on incisions to decrease the pain and bruising.    MEDICATIONS: Take as prescribed. You may take over the counter ibuprofen or tylenol for pain as directed, limit total amount of acetaminophen (tylenol) to 3 grams per 24-hour period. Okay to resume anticoagulant medication after 24hrs. You may experience constipation while taking pain medication, you may take over the counter stool softeners (Docusate/Colace or Senokot-S) as directed.     SPECIAL INSTRUCTIONS:   Call physician if they or any other problems occur:  Call the office if you have a fever over >101F, or if your incision becomes red, tender, or drains more than a small amount of clear fluid  Fever  over 101°    Redness, swelling, hardness or warmth at the operative site  Unrelieved nausea    Foul smelling or cloudy drainage at the operative site   Unrelieved pain    Blood soaked dressing (Some oozing may be normal)

## 2025-05-19 NOTE — ANESTHESIA PRE PROCEDURE
Department of Anesthesiology  Preprocedure Note       Name:  Oscar Nash   Age:  79 y.o.  :  1945                                          MRN:  [id number]         Date:  2025      Surgeon: Surgeon(s):  Ivan Golden MD    Procedure: Procedure(s):  EXCISION OF LOWER BACK AND UPPER BACK LIPOMA    Medications prior to admission:   Prior to Admission medications    Medication Sig Start Date End Date Taking? Authorizing Provider   LEVOTHYROXINE SODIUM 88 mEq by Does not apply route daily   Yes Provider, MD Elizabeth       Current medications:    Current Facility-Administered Medications   Medication Dose Route Frequency Provider Last Rate Last Admin    0.9 % sodium chloride infusion   IntraVENous Continuous Ivan Golden  mL/hr at 25 0601 New Bag at 25 0601    sodium chloride flush 0.9 % injection 5-40 mL  5-40 mL IntraVENous 2 times per day Ivan Golden MD        sodium chloride flush 0.9 % injection 5-40 mL  5-40 mL IntraVENous PRN Ivan Golden MD        0.9 % sodium chloride infusion   IntraVENous PRN Ivan Golden MD        ceFAZolin (ANCEF) 2,000 mg in sterile water 20 mL IV syringe  2,000 mg IntraVENous On Call to OR Ivan Golden MD           Allergies:    Allergies   Allergen Reactions    Bactrim [Sulfamethoxazole-Trimethoprim]     Lisinopril Hives    Pcn [Penicillins] Hives       Problem List:    Patient Active Problem List   Diagnosis Code    Sepsis due to urinary tract infection (HCC) A41.9, N39.0    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

## 2025-05-19 NOTE — H&P
GENERAL SURGERY  HISTORY AND PHYSICAL  5/19/2025    Chief Complaint: Back lipoma, back cyst      HPI:  Oscar Nash is a 79 y.o. male with history of draining lower back cyst who presents for elective excision of upper back lipoma and lower back cystic lesion.  Patient has not had any drainage from his lower back lesion for at least a year.  This lipoma has been continuously growing in size and has become bothersome.  Patient's medical history includes hypothyroidism for which she takes Synthroid.  Denies any AC or AP.    Past Medical History:   Diagnosis Date    COVID     Enlarged prostate     Hypothyroid        Past Surgical History:   Procedure Laterality Date    MOHS SURGERY      face    SKIN BIOPSY         Prior to Admission medications    Medication Sig Start Date End Date Taking? Authorizing Provider   LEVOTHYROXINE SODIUM 88 mEq by Does not apply route daily   Yes Provider, Elizabeth, MD       Allergies   Allergen Reactions    Bactrim [Sulfamethoxazole-Trimethoprim]     Lisinopril Hives    Pcn [Penicillins] Hives       History reviewed. No pertinent family history.    Social History     Tobacco Use    Smoking status: Never    Smokeless tobacco: Never   Vaping Use    Vaping status: Never Used   Substance Use Topics    Alcohol use: No    Drug use: No       REVIEW OF SYSTEMS:   Pertinent positives and negatives as noted per HPI.     PHYSICAL EXAM:  BP (!) 171/87   Pulse 79   Temp 97 °F (36.1 °C) (Temporal)   Resp 20   Ht 1.778 m (5' 10\")   Wt 106.6 kg (235 lb)   SpO2 94%   BMI 33.72 kg/m²     GENERAL: No acute distress.  HEAD: NCAT.   EYES: Anicteric. Round symmetric pupils.  CV: RR.  LUNGS/CHEST: No increased work of breathing on RA.  ABDOMEN: Soft, no tenderness, non distended. No guarding or rigidity.  SKIN: No cyanosis. No obvious rashes or ulcers.  MSK: 8 cm left upper back soft tissue mass consistent with lipoma, lower right 3 cm back lesion without drainage or erythema    LABS:  I have reviewed

## 2025-05-19 NOTE — OP NOTE
Operative Note      Patient: Oscar Nash  YOB: 1945  MRN: [id number]    Date of Procedure: 5/19/2025    Pre-Op Diagnosis Codes:      * Lipoma of back [D17.1], lower back cyst, middle back skin lesion    Post-Op Diagnosis: Same       Procedure(s):  1. EXCISION OF UPPER BACK LIPOMA WITH LAYERED CLOSURE--LIPOMA 5 CM IN LENGTH 2. EXCISION OF LOWER BACK SKIN LESION--(4 CM LENGTH) WITH INTERMEDIATE LAYER CLOSURE--4 CM LENGTH 3. EXCISION OF 1 CM SKIN LESION WITH INTERMEDIATE CLOSURE 1 CM    Surgeon(s):  Ivan Golden MD    Assistant:   Resident: Kamran Welsh DO    Anesthesia: Monitor Anesthesia Care    Estimated Blood Loss (mL): Minimal    Complications: None    Specimens:   ID Type Source Tests Collected by Time Destination   A : upper back lipoma Tissue Tissue SURGICAL PATHOLOGY Ivan Golden MD 5/19/2025 0746    B : lower back cystic lesion Tissue Tissue SURGICAL PATHOLOGY Ivan Golden MD 5/19/2025 0747    C : skin lesion Tissue Tissue SURGICAL PATHOLOGY Ivan Golden MD 5/19/2025 0747        Implants:  * No implants in log *      Drains: * No LDAs found *    Findings:  Infection Present At Time Of Surgery (PATOS) (choose all levels that have infection present):  No infection present  Other Findings: 5 cm upper back lipoma, 4 cm lower back cyst, 1 cm back skin lesion  This procedure was not performed to treat primary cutaneous melanoma through wide local excision    Detailed Description of Procedure:     The area was prepped and draped in the standard sterile fashion. 10 cc 1% lidocaine with epinephrine was injected to provide local anesthesia.  First, attention was turned to the upper back lipoma.  Using a #15 blade, a linear incision was made and blunt dissection using a hemostat and electrocautery  were used to excise the upper back lipoma.  The lipoma was located beyond the subcutaneous space and incorporated into the muscle. This lipoma had to be dissected from the muscle.  starting tomorrow.  Keep wound dry and clean.  Follow-up in 2 weeks for wound check and to review pathology.    Dr. Domingo was present and scrubbed for the entire procedure.       Electronically signed by Kamran Welsh DO on 5/19/2025 at 8:58 AM    SUSANA DOMINGO MD

## 2025-05-19 NOTE — ANESTHESIA POSTPROCEDURE EVALUATION
Department of Anesthesiology  Postprocedure Note    Patient: Oscar Nash  MRN: [id number]  YOB: 1945  Date of evaluation: 5/19/2025    Procedure Summary       Date: 05/19/25 Room / Location: [address]    Anesthesia Start: 0717 Anesthesia Stop: 0902    Procedure: 1. EXCISION OF UPPER BACK LIPOMA WITH LAYERED CLOSURE--LIPOMA 5 CM IN LENGTH 2. EXCISION OF LOWER BACK SKIN LESION--(4 CM LENGTH) WITH INTERMEDIATE LAYER CLOSURE--4 CM LENGTH 3. EXCISION OF 1 CM SKIN LESION WITH INTERMEDIATE CLOSURE 1 CM (Back) Diagnosis:       Lipoma of back      (Lipoma of back [D17.1])    Surgeons: Ivan Golden MD Responsible Provider: Jeremi Berman MD    Anesthesia Type: MAC ASA Status: 2            Anesthesia Type: No value filed.    Frandy Phase I: Frandy Score: 10    Frandy Phase II: Frandy Score: 10    Anesthesia Post Evaluation    Patient location during evaluation: PACU  Patient participation: complete - patient participated  Level of consciousness: awake and alert  Airway patency: patent  Nausea & Vomiting: no nausea and no vomiting  Cardiovascular status: hemodynamically stable  Respiratory status: acceptable  Hydration status: euvolemic  Multimodal analgesia pain management approach  Pain management: adequate    No notable events documented.

## 2025-05-23 LAB — SURGICAL PATHOLOGY REPORT: NORMAL

## 2025-05-27 ENCOUNTER — TELEPHONE (OUTPATIENT)
Age: 80
End: 2025-05-27

## 2025-05-27 ENCOUNTER — OFFICE VISIT (OUTPATIENT)
Age: 80
End: 2025-05-27

## 2025-05-27 VITALS
RESPIRATION RATE: 16 BRPM | BODY MASS INDEX: 34.65 KG/M2 | TEMPERATURE: 97.3 F | HEIGHT: 70 IN | WEIGHT: 242 LBS | OXYGEN SATURATION: 96 % | SYSTOLIC BLOOD PRESSURE: 184 MMHG | HEART RATE: 92 BPM | DIASTOLIC BLOOD PRESSURE: 93 MMHG

## 2025-05-27 DIAGNOSIS — D17.1 LIPOMA OF BACK: Primary | ICD-10-CM

## 2025-05-27 DIAGNOSIS — L76.33 POSTOPERATIVE SEROMA OF SKIN AFTER DERMATOLOGIC PROCEDURE: ICD-10-CM

## 2025-05-27 PROCEDURE — 99024 POSTOP FOLLOW-UP VISIT: CPT | Performed by: SURGERY

## 2025-05-27 NOTE — TELEPHONE ENCOUNTER
MA received a voicemail from pt stating that he is having redness, swelling and odor near abscess/surgery site, pt wants to know if he should report to the ED to be evaluated or be seen sooner? MA routing to floor rounder Dr. Delacruz as Dr. Golden is not available. MA awaiting for response.  Pt can be reached at [phone]    Electronically signed by Donna Bailey on 5/27/2025 at 1:23 PM

## 2025-05-29 NOTE — PROGRESS NOTES
[facility]  PROGRESS NOTE  ATTENDING NOTE    Chief Complaint   Patient presents with    Abscess     Pt states that it is not the surgical incision it is an abscess below it that is draining, red and foul smelling. Denies any fever or chills.     S:  78y/o M s/p multiple soft tissue neoplasm excisions of the back by Dr. Golden.  He was concerned about the incision that has the sutures.  He was concerned about infection.  It is draining some thick material    BP (!) 184/93 (BP Site: Right Upper Arm, Patient Position: Sitting, BP Cuff Size: Large Adult)   Pulse 92   Temp 97.3 °F (36.3 °C) (Infrared)   Resp 16   Ht 1.778 m (5' 10\")   Wt 109.8 kg (242 lb)   SpO2 96%   BMI 34.72 kg/m²   Gen:  NAD  Midback incision:  intact with suture.  There is pus on the bandage.  There is no surrounding cellulitis.    Due to concern for infection elected to remove the sutures and evacuate any fluid.  All sutures removed and an old hematoma and copious serous fluid under pressure was released.  I cleaned the wound and packed it with 1/4\" packing strip.    Advised patient to remove the packing strip in 48h and ok to leave out.  Keep covered with bandage.  Ok to shower once packing strip out.    ASSESSMENT/PLAN:  Seroma--s/p evacuation  --f/u with Dr. Golden  --please call the office if any further issues with wound or fevers    Nan Delacruz MD, MSc, FACS  5/29/2025  6:46 PM

## 2025-06-04 ENCOUNTER — OFFICE VISIT (OUTPATIENT)
Dept: SURGERY | Age: 80
End: 2025-06-04

## 2025-06-04 VITALS
DIASTOLIC BLOOD PRESSURE: 91 MMHG | HEART RATE: 79 BPM | HEIGHT: 70 IN | WEIGHT: 242 LBS | BODY MASS INDEX: 34.65 KG/M2 | RESPIRATION RATE: 16 BRPM | SYSTOLIC BLOOD PRESSURE: 172 MMHG

## 2025-06-04 DIAGNOSIS — D17.1 LIPOMA OF BACK: Primary | ICD-10-CM

## 2025-06-04 PROCEDURE — 99024 POSTOP FOLLOW-UP VISIT: CPT | Performed by: SURGERY

## 2025-06-04 NOTE — PATIENT INSTRUCTIONS
Call [phone] for any questions/concerns.    Keep area clean and dry.  Wash with antibacterial soap.

## 2025-06-04 NOTE — PROGRESS NOTES
Vaping Use    Vaping status: Never Used   Substance Use Topics    Alcohol use: No    Drug use: No         Review of Systems: pertinent ROS listed in HPI, all others negative       PHYSICAL EXAM:    Vitals:    06/04/25 1101   BP: (!) 172/91   Pulse: 79   Resp: 16       GENERAL:  NAD. A&Ox3.  HEAD:  Normocephalic, atraumatic.   EYES:   No scleral icterus. PERRLA.  LUNGS:  No increased work of breathing.  CARDIOVASCULAR: RR  ABDOMEN:  Soft, non-distended, non-tender. No guarding, rigidity, rebound.  EXT: warm and well perfused  SKIN: Left upper shoulder 4 cm incision well approximated, skin glue falling off. Right flank incision with well healing scab. Lower middle back incision with small area of skin separation, otherwise healing well, no tenderness to palpation, no significant drainage, area of erythema along border of bandage      ASSESSMENT/PLAN:  79 y.o. male here for wound check following various lipoma excisions and s/p expression of infected hematoma. Areas are healing well.     - continue local wound care as needed  - f/u with general surgery clinic prn    Plan will be discussed with Dr. Arias.    Angie Ramirez MD  Surgery Resident PGY-4  6/4/2025  11:18 AM

## (undated) DEVICE — DRESSING COMP W4XL4IN N ADH PD W2.5XL2.5IN GZ BORDERED ADH

## (undated) DEVICE — LIQUIBAND RAPID ADHESIVE 36/CS 0.8ML: Brand: MEDLINE

## (undated) DEVICE — ELECTRODE PT RET AD L9FT HI MOIST COND ADH HYDRGEL CORDED

## (undated) DEVICE — SYRINGE MED 10ML LUERLOCK TIP W/O SFTY DISP

## (undated) DEVICE — GLOVE ORANGE PI 7 1/2   MSG9075

## (undated) DEVICE — GOWN,SIRUS,FABRNF,XL,20/CS: Brand: MEDLINE

## (undated) DEVICE — UNIVERSAL DRAPE: Brand: MEDLINE INDUSTRIES, INC.

## (undated) DEVICE — BLADE,STAINLESS-STEEL,15,STRL,DISPOSABLE: Brand: MEDLINE